# Patient Record
Sex: MALE | Race: WHITE | Employment: OTHER | URBAN - METROPOLITAN AREA
[De-identification: names, ages, dates, MRNs, and addresses within clinical notes are randomized per-mention and may not be internally consistent; named-entity substitution may affect disease eponyms.]

---

## 2019-03-06 ENCOUNTER — HOSPITAL ENCOUNTER (EMERGENCY)
Age: 49
Discharge: HOME | End: 2019-03-06
Attending: EMERGENCY MEDICINE
Payer: COMMERCIAL

## 2019-03-06 VITALS
SYSTOLIC BLOOD PRESSURE: 164 MMHG | RESPIRATION RATE: 18 BRPM | DIASTOLIC BLOOD PRESSURE: 96 MMHG | HEART RATE: 70 BPM | TEMPERATURE: 98 F | OXYGEN SATURATION: 100 % | WEIGHT: 213 LBS

## 2019-03-06 DIAGNOSIS — A09 TRAVELER'S DIARRHEA: Primary | ICD-10-CM

## 2019-03-06 PROCEDURE — 87329 GIARDIA AG IA: CPT | Performed by: EMERGENCY MEDICINE

## 2019-03-06 PROCEDURE — 87046 STOOL CULTR AEROBIC BACT EA: CPT | Performed by: EMERGENCY MEDICINE

## 2019-03-06 PROCEDURE — 82272 OCCULT BLD FECES 1-3 TESTS: CPT | Performed by: EMERGENCY MEDICINE

## 2019-03-06 PROCEDURE — 99283 EMERGENCY DEPT VISIT LOW MDM: CPT

## 2019-03-06 PROCEDURE — 87427 SHIGA-LIKE TOXIN AG IA: CPT | Performed by: EMERGENCY MEDICINE

## 2019-03-06 PROCEDURE — 87177 OVA AND PARASITES SMEARS: CPT | Performed by: EMERGENCY MEDICINE

## 2019-03-06 PROCEDURE — 87272 CRYPTOSPORIDIUM AG IF: CPT | Performed by: EMERGENCY MEDICINE

## 2019-03-06 PROCEDURE — 87045 FECES CULTURE AEROBIC BACT: CPT | Performed by: EMERGENCY MEDICINE

## 2019-03-06 PROCEDURE — 87493 C DIFF AMPLIFIED PROBE: CPT | Performed by: EMERGENCY MEDICINE

## 2019-03-06 PROCEDURE — 87209 SMEAR COMPLEX STAIN: CPT | Performed by: EMERGENCY MEDICINE

## 2019-03-06 RX ORDER — ONDANSETRON 4 MG/1
4 TABLET, ORALLY DISINTEGRATING ORAL ONCE
Status: COMPLETED | OUTPATIENT
Start: 2019-03-06 | End: 2019-03-06

## 2019-03-06 RX ORDER — ONDANSETRON 4 MG/1
4 TABLET, ORALLY DISINTEGRATING ORAL EVERY 4 HOURS PRN
Qty: 10 TABLET | Refills: 0 | Status: SHIPPED | OUTPATIENT
Start: 2019-03-06 | End: 2019-03-13

## 2019-03-06 RX ORDER — AZITHROMYCIN 500 MG/1
500 TABLET, FILM COATED ORAL DAILY
Qty: 3 TABLET | Refills: 0 | Status: SHIPPED | OUTPATIENT
Start: 2019-03-06 | End: 2019-03-09

## 2019-03-06 NOTE — ED PROVIDER NOTES
Patient Seen in: [facility] Emergency Department In [place]    History   Patient presents with:  Abdomen/Flank Pain (GI/)    Stated Complaint: abd pain    HPI    52-year-old male, recently returned from [facility] here with diarrhea.   He has had diarrhea pr mood and affect. Thought content normal.       ED Course     Labs Reviewed   C. DIFFICILE(TOXIGENIC)PCR   OCCULT BLOOD, STOOL   STOOL CULTURE W/SHIGATOXIN   OVA AND PARASITE W GIARDIA EIA          Patient was given oral Zofran and felt better.       LYNDSAY   W

## 2019-03-06 NOTE — ED INITIAL ASSESSMENT (HPI)
Pt was in [address] Monday when he started getting abdominal cramps and diarrhea.  Denies nausea or vomiting

## 2019-03-07 LAB
C DIFF TOX B STL QL: NEGATIVE
CRYPTOSP AG STL QL IA: NEGATIVE
G LAMBLIA AG STL QL IA: NEGATIVE

## 2019-03-10 LAB
OVA AND PARASITE, TRICHROME STAIN: NEGATIVE
OVA AND PARASITE, WET MOUNT: NEGATIVE

## 2021-08-14 ENCOUNTER — APPOINTMENT (OUTPATIENT)
Dept: GENERAL RADIOLOGY | Facility: HOSPITAL | Age: 51
DRG: 177 | End: 2021-08-14
Attending: EMERGENCY MEDICINE
Payer: COMMERCIAL

## 2021-08-14 ENCOUNTER — HOSPITAL ENCOUNTER (INPATIENT)
Facility: HOSPITAL | Age: 51
LOS: 13 days | Discharge: HOME | DRG: 177 | End: 2021-08-28
Attending: EMERGENCY MEDICINE | Admitting: INTERNAL MEDICINE
Payer: COMMERCIAL

## 2021-08-14 DIAGNOSIS — J12.82 PNEUMONIA DUE TO COVID-19 VIRUS: Primary | ICD-10-CM

## 2021-08-14 DIAGNOSIS — U07.1 PNEUMONIA DUE TO COVID-19 VIRUS: Primary | ICD-10-CM

## 2021-08-14 DIAGNOSIS — R09.02 HYPOXIA: ICD-10-CM

## 2021-08-14 PROBLEM — E87.1 HYPONATREMIA: Status: ACTIVE | Noted: 2021-08-14

## 2021-08-14 LAB
ALBUMIN SERPL-MCNC: 3.8 G/DL (ref 3.4–5)
ALBUMIN/GLOB SERPL: 1 {RATIO} (ref 1–2)
ALP LIVER SERPL-CCNC: 37 U/L
ALT SERPL-CCNC: 34 U/L
ANION GAP SERPL CALC-SCNC: 4 MMOL/L (ref 0–18)
AST SERPL-CCNC: 31 U/L (ref 15–37)
BASOPHILS # BLD AUTO: 0.01 X10(3) UL (ref 0–0.2)
BASOPHILS NFR BLD AUTO: 0.3 %
BILIRUB SERPL-MCNC: 0.6 MG/DL (ref 0.1–2)
BUN BLD-MCNC: 13 MG/DL (ref 7–18)
CALCIUM BLD-MCNC: 9.1 MG/DL (ref 8.5–10.1)
CHLORIDE SERPL-SCNC: 102 MMOL/L (ref 98–112)
CO2 SERPL-SCNC: 29 MMOL/L (ref 21–32)
CREAT BLD-MCNC: 0.93 MG/DL
D-DIMER: 0.42 UG/ML FEU (ref ?–0.5)
EOSINOPHIL # BLD AUTO: 0 X10(3) UL (ref 0–0.7)
EOSINOPHIL NFR BLD AUTO: 0 %
ERYTHROCYTE [DISTWIDTH] IN BLOOD BY AUTOMATED COUNT: 12.5 %
GLOBULIN PLAS-MCNC: 3.9 G/DL (ref 2.8–4.4)
GLUCOSE BLD-MCNC: 90 MG/DL (ref 70–99)
HCT VFR BLD AUTO: 44.5 %
HGB BLD-MCNC: 14.2 G/DL
IMM GRANULOCYTES # BLD AUTO: 0.01 X10(3) UL (ref 0–1)
IMM GRANULOCYTES NFR BLD: 0.3 %
LYMPHOCYTES # BLD AUTO: 0.47 X10(3) UL (ref 1–4)
LYMPHOCYTES NFR BLD AUTO: 15.8 %
M PROTEIN MFR SERPL ELPH: 7.7 G/DL (ref 6.4–8.2)
MCH RBC QN AUTO: 29.6 PG (ref 26–34)
MCHC RBC AUTO-ENTMCNC: 31.9 G/DL (ref 31–37)
MCV RBC AUTO: 92.7 FL
MONOCYTES # BLD AUTO: 0.18 X10(3) UL (ref 0.1–1)
MONOCYTES NFR BLD AUTO: 6.1 %
NEUTROPHILS # BLD AUTO: 2.3 X10 (3) UL (ref 1.5–7.7)
NEUTROPHILS # BLD AUTO: 2.3 X10(3) UL (ref 1.5–7.7)
NEUTROPHILS NFR BLD AUTO: 77.5 %
OSMOLALITY SERPL CALC.SUM OF ELEC: 280 MOSM/KG (ref 275–295)
PLATELET # BLD AUTO: 180 10(3)UL (ref 150–450)
POTASSIUM SERPL-SCNC: 3.6 MMOL/L (ref 3.5–5.1)
RBC # BLD AUTO: 4.8 X10(6)UL
SARS-COV-2 RNA RESP QL NAA+PROBE: DETECTED
SODIUM SERPL-SCNC: 135 MMOL/L (ref 136–145)
TROPONIN I SERPL-MCNC: <0.045 NG/ML (ref ?–0.04)
WBC # BLD AUTO: 3 X10(3) UL (ref 4–11)

## 2021-08-14 PROCEDURE — 71045 X-RAY EXAM CHEST 1 VIEW: CPT | Performed by: EMERGENCY MEDICINE

## 2021-08-14 PROCEDURE — 3E0333Z INTRODUCTION OF ANTI-INFLAMMATORY INTO PERIPHERAL VEIN, PERCUTANEOUS APPROACH: ICD-10-PCS | Performed by: INTERNAL MEDICINE

## 2021-08-14 RX ORDER — LOSARTAN POTASSIUM AND HYDROCHLOROTHIAZIDE 12.5; 5 MG/1; MG/1
1 TABLET ORAL DAILY
COMMUNITY
End: 2021-09-03

## 2021-08-14 RX ORDER — DEXAMETHASONE SODIUM PHOSPHATE 4 MG/ML
6 VIAL (ML) INJECTION ONCE
Status: COMPLETED | OUTPATIENT
Start: 2021-08-14 | End: 2021-08-14

## 2021-08-15 PROBLEM — R09.02 HYPOXIA: Status: ACTIVE | Noted: 2021-08-15

## 2021-08-15 LAB
ALBUMIN SERPL-MCNC: 3.2 G/DL (ref 3.4–5)
ALBUMIN/GLOB SERPL: 0.8 {RATIO} (ref 1–2)
ALP LIVER SERPL-CCNC: 32 U/L
ALT SERPL-CCNC: 31 U/L
ANION GAP SERPL CALC-SCNC: 2 MMOL/L (ref 0–18)
AST SERPL-CCNC: 30 U/L (ref 15–37)
BASOPHILS # BLD AUTO: 0 X10(3) UL (ref 0–0.2)
BASOPHILS NFR BLD AUTO: 0 %
BILIRUB SERPL-MCNC: 0.4 MG/DL (ref 0.1–2)
BUN BLD-MCNC: 16 MG/DL (ref 7–18)
CALCIUM BLD-MCNC: 9 MG/DL (ref 8.5–10.1)
CHLORIDE SERPL-SCNC: 104 MMOL/L (ref 98–112)
CO2 SERPL-SCNC: 29 MMOL/L (ref 21–32)
CREAT BLD-MCNC: 0.83 MG/DL
CRP SERPL-MCNC: 9.09 MG/DL (ref ?–0.3)
D-DIMER: 0.28 UG/ML FEU (ref ?–0.5)
DEPRECATED HBV CORE AB SER IA-ACNC: 2527.2 NG/ML
EOSINOPHIL # BLD AUTO: 0 X10(3) UL (ref 0–0.7)
EOSINOPHIL NFR BLD AUTO: 0 %
ERYTHROCYTE [DISTWIDTH] IN BLOOD BY AUTOMATED COUNT: 12.5 %
GLOBULIN PLAS-MCNC: 3.9 G/DL (ref 2.8–4.4)
GLUCOSE BLD-MCNC: 107 MG/DL (ref 70–99)
HCT VFR BLD AUTO: 42 %
HGB BLD-MCNC: 13.6 G/DL
IL6 SERPL-MCNC: 38.8 PG/ML (ref ?–4.4)
IMM GRANULOCYTES # BLD AUTO: 0.01 X10(3) UL (ref 0–1)
IMM GRANULOCYTES NFR BLD: 0.3 %
LDH SERPL L TO P-CCNC: 260 U/L
LYMPHOCYTES # BLD AUTO: 0.4 X10(3) UL (ref 1–4)
LYMPHOCYTES NFR BLD AUTO: 12.7 %
M PROTEIN MFR SERPL ELPH: 7.1 G/DL (ref 6.4–8.2)
MCH RBC QN AUTO: 30.2 PG (ref 26–34)
MCHC RBC AUTO-ENTMCNC: 32.4 G/DL (ref 31–37)
MCV RBC AUTO: 93.1 FL
MONOCYTES # BLD AUTO: 0.17 X10(3) UL (ref 0.1–1)
MONOCYTES NFR BLD AUTO: 5.4 %
NEUTROPHILS # BLD AUTO: 2.56 X10 (3) UL (ref 1.5–7.7)
NEUTROPHILS # BLD AUTO: 2.56 X10(3) UL (ref 1.5–7.7)
NEUTROPHILS NFR BLD AUTO: 81.6 %
OSMOLALITY SERPL CALC.SUM OF ELEC: 282 MOSM/KG (ref 275–295)
PLATELET # BLD AUTO: 200 10(3)UL (ref 150–450)
POTASSIUM SERPL-SCNC: 4.3 MMOL/L (ref 3.5–5.1)
RBC # BLD AUTO: 4.51 X10(6)UL
SODIUM SERPL-SCNC: 135 MMOL/L (ref 136–145)
WBC # BLD AUTO: 3.1 X10(3) UL (ref 4–11)

## 2021-08-15 PROCEDURE — XW033E5 INTRODUCTION OF REMDESIVIR ANTI-INFECTIVE INTO PERIPHERAL VEIN, PERCUTANEOUS APPROACH, NEW TECHNOLOGY GROUP 5: ICD-10-PCS | Performed by: INTERNAL MEDICINE

## 2021-08-15 PROCEDURE — 99223 1ST HOSP IP/OBS HIGH 75: CPT | Performed by: INTERNAL MEDICINE

## 2021-08-15 RX ORDER — ONDANSETRON 2 MG/ML
4 INJECTION INTRAMUSCULAR; INTRAVENOUS EVERY 6 HOURS PRN
Status: DISCONTINUED | OUTPATIENT
Start: 2021-08-15 | End: 2021-08-28

## 2021-08-15 RX ORDER — ENOXAPARIN SODIUM 100 MG/ML
40 INJECTION SUBCUTANEOUS DAILY
Status: DISCONTINUED | OUTPATIENT
Start: 2021-08-15 | End: 2021-08-20

## 2021-08-15 RX ORDER — GUAIFENESIN 600 MG
600 TABLET, EXTENDED RELEASE 12 HR ORAL 2 TIMES DAILY
Status: DISCONTINUED | OUTPATIENT
Start: 2021-08-15 | End: 2021-08-28

## 2021-08-15 RX ORDER — ZINC SULFATE 50(220)MG
220 CAPSULE ORAL DAILY
Status: ON HOLD | COMMUNITY
End: 2021-08-28

## 2021-08-15 RX ORDER — MELATONIN
3 NIGHTLY PRN
Status: DISCONTINUED | OUTPATIENT
Start: 2021-08-15 | End: 2021-08-28

## 2021-08-15 RX ORDER — BENZONATATE 200 MG/1
200 CAPSULE ORAL 3 TIMES DAILY PRN
Status: DISCONTINUED | OUTPATIENT
Start: 2021-08-15 | End: 2021-08-28

## 2021-08-15 RX ORDER — ALBUTEROL SULFATE 90 UG/1
4 AEROSOL, METERED RESPIRATORY (INHALATION) EVERY 4 HOURS PRN
Status: DISCONTINUED | OUTPATIENT
Start: 2021-08-15 | End: 2021-08-28

## 2021-08-15 RX ORDER — DEXAMETHASONE SODIUM PHOSPHATE 4 MG/ML
6 VIAL (ML) INJECTION DAILY
Status: DISCONTINUED | OUTPATIENT
Start: 2021-08-15 | End: 2021-08-21

## 2021-08-15 RX ORDER — ASCORBIC ACID 500 MG
1000 TABLET ORAL DAILY
Status: DISCONTINUED | OUTPATIENT
Start: 2021-08-15 | End: 2021-08-28

## 2021-08-15 RX ORDER — ZINC SULFATE 50(220)MG
220 CAPSULE ORAL 2 TIMES DAILY
Status: DISCONTINUED | OUTPATIENT
Start: 2021-08-15 | End: 2021-08-28

## 2021-08-15 RX ORDER — MULTIVIT WITH MINERALS/LUTEIN
1000 TABLET ORAL DAILY
Status: ON HOLD | COMMUNITY
End: 2021-08-28

## 2021-08-15 RX ORDER — CHOLECALCIFEROL (VITAMIN D3) 125 MCG
2000 CAPSULE ORAL DAILY
Status: DISCONTINUED | OUTPATIENT
Start: 2021-08-15 | End: 2021-08-28

## 2021-08-15 RX ORDER — ACETAMINOPHEN 325 MG/1
650 TABLET ORAL EVERY 6 HOURS PRN
Status: DISCONTINUED | OUTPATIENT
Start: 2021-08-15 | End: 2021-08-28

## 2021-08-15 RX ORDER — LOSARTAN POTASSIUM 50 MG/1
50 TABLET ORAL DAILY
Status: DISCONTINUED | OUTPATIENT
Start: 2021-08-15 | End: 2021-08-22

## 2021-08-15 RX ORDER — ONDANSETRON 2 MG/ML
4 INJECTION INTRAMUSCULAR; INTRAVENOUS EVERY 4 HOURS PRN
Status: ACTIVE | OUTPATIENT
Start: 2021-08-15 | End: 2021-08-15

## 2021-08-15 RX ORDER — PROCHLORPERAZINE EDISYLATE 5 MG/ML
5 INJECTION INTRAMUSCULAR; INTRAVENOUS EVERY 8 HOURS PRN
Status: DISCONTINUED | OUTPATIENT
Start: 2021-08-15 | End: 2021-08-28

## 2021-08-15 NOTE — PLAN OF CARE
NURSING ADMISSION NOTE    Pt admitted from ED to room 516. Patient admitted via Cart. Oriented to room. Safety precautions initiated. Bed in low position. Call light in reach. Admission navigator completed. Pt a/ox4. VSS. NSR per tele.   Angel Contreras

## 2021-08-15 NOTE — CONSULTS
INFECTIOUS DISEASE CONSULT NOTE    [address] [facility] Patient Status:  Inpatient    1970 MRN [id number]   [facility] 5NW-A Attending Artis Rosa,    Hosp Day # 0 PCP None Pcp       Reason for Consultation:  Covid 19 infection    Hist Oral, Daily **OR** dexamethasone Sodium Phosphate (DECADRON) 4 MG/ML injection 6 mg, 6 mg, Intravenous, Daily  •  benzonatate (TESSALON) cap 200 mg, 200 mg, Oral, TID PRN  •  zinc sulfate (ZINCATE) cap 220 mg, 220 mg, Oral, BID  •  Vitamin D3 (Cholecalcife RDW 12.5   NEPRELIM 2.30   WBC 3.0*   .0     Recent Labs   Lab 08/14/21  2226   GLU 90   BUN 13   CREATSERUM 0.93   GFRAA 110   GFRNAA 95   CA 9.1   ALB 3.8   *   K 3.6      CO2 29.0   ALKPHO 37*   AST 31   ALT 34   BILT 0.6   TP 7.7 dexa  - start RDV  - follow O2 sats, would consider actemra if develops hypoxia with O2 requirements above 6L  - prone as able, otherwise encourage ambulation and IS  - trend inflammatory markers    2. acute hypoxic resp failure due to above  - wean O2 as

## 2021-08-15 NOTE — PLAN OF CARE
COVID-19 Daily Discharge Readiness-Nursing    O2 Sat at Rest:     %   O2 Sat with Exertion:    % on    liters   Temperature max from last 24 hrs: Temp (24hrs), Av.7 °F (37.1 °C), Min:97.8 °F (36.6 °C), Max:100 °F (37.8 °C)    Inflammatory Markers:   Re

## 2021-08-15 NOTE — ED PROVIDER NOTES
Patient Seen in: [facility] 5nw-a      History   Patient presents with:  Covid    Stated Complaint: LIGHTHEADED, COVID, LOW PULSE OX    HPI/Subjective:   HPI    Patient 60-year-old male who is unvaccinated.   Patient states last Saturday he developed sounds, soft, nontender, nondistended. No rebound, no guarding, no hepatosplenomegaly. EXTREMITIES: Full range of motion, no tenderness, good capillary refill. No calf tenderness or edema  SKIN: No rash, good turgor.   NEURO: Patient answers questions ap will be admitted for further evaluation. Patient did receive Decadron.   I did speak with the [facility] hospitalist.  Admission disposition: 8/15/2021 12:18 AM                                  Disposition and Plan     Clinical Impression:  Pneumonia due to CO

## 2021-08-15 NOTE — ED INITIAL ASSESSMENT (HPI)
Pt hung out with a group of people that all got covid. Pt dx Tuesday with covid. Pt reports short of breath, cough, dizziness. Pt took tylenol.

## 2021-08-15 NOTE — H&P
MARIE HOSPITALIST  History and Physical     Lemuel Burns Patient Status:  Emergency    1970 MRN [id number]   Location [address] [facility] Attending Juany Ricks MD   Hosp Day # 0 PCP None Pcp     Chief Complaint: SOB    Histor No lymphadenopathy. No JVD. No carotid bruits. Respiratory: Clear to auscultation bilaterally. No wheezes. No rhonchi. Cardiovascular: S1, S2. Regular rate and rhythm. No murmurs, rubs or gallops. Equal pulses.    Chest and Back: No tenderness or deformit

## 2021-08-16 LAB
ALBUMIN SERPL-MCNC: 3.3 G/DL (ref 3.4–5)
ALBUMIN/GLOB SERPL: 0.8 {RATIO} (ref 1–2)
ALP LIVER SERPL-CCNC: 34 U/L
ALT SERPL-CCNC: 36 U/L
ANION GAP SERPL CALC-SCNC: 4 MMOL/L (ref 0–18)
AST SERPL-CCNC: 31 U/L (ref 15–37)
ATRIAL RATE: 84 BPM
BASOPHILS # BLD AUTO: 0.01 X10(3) UL (ref 0–0.2)
BASOPHILS NFR BLD AUTO: 0.1 %
BILIRUB SERPL-MCNC: 0.5 MG/DL (ref 0.1–2)
BUN BLD-MCNC: 18 MG/DL (ref 7–18)
CALCIUM BLD-MCNC: 9.2 MG/DL (ref 8.5–10.1)
CHLORIDE SERPL-SCNC: 107 MMOL/L (ref 98–112)
CO2 SERPL-SCNC: 27 MMOL/L (ref 21–32)
CREAT BLD-MCNC: 0.91 MG/DL
CRP SERPL-MCNC: 6.02 MG/DL (ref ?–0.3)
D-DIMER: 0.3 UG/ML FEU (ref ?–0.5)
DEPRECATED HBV CORE AB SER IA-ACNC: 2083.3 NG/ML
EOSINOPHIL # BLD AUTO: 0 X10(3) UL (ref 0–0.7)
EOSINOPHIL NFR BLD AUTO: 0 %
ERYTHROCYTE [DISTWIDTH] IN BLOOD BY AUTOMATED COUNT: 12.7 %
GLOBULIN PLAS-MCNC: 4 G/DL (ref 2.8–4.4)
GLUCOSE BLD-MCNC: 109 MG/DL (ref 70–99)
HCT VFR BLD AUTO: 42 %
HGB BLD-MCNC: 14.3 G/DL
IMM GRANULOCYTES # BLD AUTO: 0.02 X10(3) UL (ref 0–1)
IMM GRANULOCYTES NFR BLD: 0.2 %
LDH SERPL L TO P-CCNC: 303 U/L
LYMPHOCYTES # BLD AUTO: 0.52 X10(3) UL (ref 1–4)
LYMPHOCYTES NFR BLD AUTO: 5.4 %
M PROTEIN MFR SERPL ELPH: 7.3 G/DL (ref 6.4–8.2)
MCH RBC QN AUTO: 31 PG (ref 26–34)
MCHC RBC AUTO-ENTMCNC: 34 G/DL (ref 31–37)
MCV RBC AUTO: 90.9 FL
MONOCYTES # BLD AUTO: 0.59 X10(3) UL (ref 0.1–1)
MONOCYTES NFR BLD AUTO: 6.1 %
NEUTROPHILS # BLD AUTO: 8.55 X10 (3) UL (ref 1.5–7.7)
NEUTROPHILS # BLD AUTO: 8.55 X10(3) UL (ref 1.5–7.7)
NEUTROPHILS NFR BLD AUTO: 88.2 %
OSMOLALITY SERPL CALC.SUM OF ELEC: 288 MOSM/KG (ref 275–295)
P AXIS: -6 DEGREES
P-R INTERVAL: 152 MS
PLATELET # BLD AUTO: 243 10(3)UL (ref 150–450)
POTASSIUM SERPL-SCNC: 4.3 MMOL/L (ref 3.5–5.1)
Q-T INTERVAL: 352 MS
QRS DURATION: 96 MS
QTC CALCULATION (BEZET): 415 MS
R AXIS: 71 DEGREES
RBC # BLD AUTO: 4.62 X10(6)UL
SODIUM SERPL-SCNC: 138 MMOL/L (ref 136–145)
T AXIS: 21 DEGREES
VENTRICULAR RATE: 84 BPM
WBC # BLD AUTO: 9.7 X10(3) UL (ref 4–11)

## 2021-08-16 PROCEDURE — 99232 SBSQ HOSP IP/OBS MODERATE 35: CPT | Performed by: HOSPITALIST

## 2021-08-16 NOTE — PAYOR COMM NOTE
--------------  ADMISSION REVIEW     Payor: JOSE ZUNIGA  Subscriber #:  [id number]  Authorization Number: [id number]    Admit date: 8/15/21  Admit time:  2:23 AM      Patient Seen in: [facility] ED    History   Stated Complaint: LIGHTHEADED, COVID, LOW x-rayShallow inspiration.  Possible ground-glass opacity in left mid lower lung may represent mild COVID-19 pneumonia.         MDM      Patient pulse ox did drop to 89% on room air at rest.  Patient will be admitted for further evaluation.   Patient did rec Quyen Allen is a a(n) 49year old male admitted today with covid 19 pna. His symptoms started 8 days ago with fevers, malaise and cough tested + on 8/10. Also with n/v/d initially which resolved but fevers, malaise and cough persisted.  Yesterday he noted more 9.1 9.0 9.2   ALB 3.8 3.2* 3.3*   * 135* 138   K 3.6 4.3 4.3    104 107   CO2 29.0 29.0 27.0   ALKPHO 37* 32* 34*   AST 31 30 31   ALT 34 31 36   BILT 0.6 0.4 0.5   TP 7.7 7.1 7.3      No results found for: ESRML       C-Reactive Protein (mg/dL mg in sodium chloride 0.9% 250 mL IVPB     Date Action Dose Route User    8/15/2021 1657 New Bag 200 mg Intravenous Radha Ureña, RN      Vitamin D3 (Cholecalciferol) tab 2,000 Units     Date Action Dose Route User    8/16/2021 0840 Given 2,000 Unit

## 2021-08-16 NOTE — PROGRESS NOTES
COVID-19 Daily Discharge Readiness-Nursing    O2 Sat at Rest:    92 % RA  O2 Sat with Exertion:     Temperature max from last 24 hrs: Temp (24hrs), Av.8 °F (37.1 °C), Min:98.4 °F (36.9 °C), Max:99.5 °F (37.5 °C)    Inflammatory Markers:   Recent Labs

## 2021-08-16 NOTE — PROGRESS NOTES
COVID-19 Daily Discharge Readiness-Nursing    O2 Sat at Rest:   91% on 2L O2  Temperature max from last 24 hrs: Temp (24hrs), Av.9 °F (37.2 °C), Min:98.4 °F (36.9 °C), Max:100 °F (37.8 °C)    Inflammatory Markers:   Recent Labs   Lab 21  [id number]

## 2021-08-16 NOTE — PROGRESS NOTES
INFECTIOUS DISEASE PROGRESS NOTE    [facility], [facility]. Patient Status:  Inpatient    1970 MRN [id number]   [facility] 5NW-A Attending Edd Gutierres DO   Hosp Day # 1 PCP None Pcp     Remdesivir d#2  Dexa      Subjective: Pt seen and exa Supple. Respiratory: Clear to auscultation bilaterally. No wheezes. No rhonchi. Cardiovascular: S1, S2.  Regular rate and rhythm. No murmurs. Abdomen: Soft, nontender, nondistended. Positive bowel sounds.   Musculoskeletal: Full range of motion of all (CPT=71045)    Result Date: 8/14/2021  PROCEDURE:  XR CHEST AP PORTABLE  (CPT=71045)  TECHNIQUE:  AP chest radiograph was obtained. COMPARISON:  None.   INDICATIONS:  LIGHTHEADED, COVID, LOW PULSE OX  PATIENT STATED HISTORY: (As transcribed by Technologist

## 2021-08-16 NOTE — PROGRESS NOTES
MARIE HOSPITALIST  Progress Note     Levie Dancer Patient Status:  Inpatient    1970 MRN [id number]   [facility] 5NW-A Attending Lilly Sylvester, [address] [facility] Day # 1 PCP None Pcp     Chief Complaint: SOB    S: Patient seen and examined hours. Cardiac  Recent Labs   Lab 08/14/21 2226   TROP <0.045       Creatinine Kinase  No results for input(s): CK in the last 168 hours.     Inflammatory Markers  Recent Labs   Lab 08/14/21  2226 08/15/21  1650 08/16/21  0844   CRP  --  9.09* 6.02*   F

## 2021-08-17 LAB
ALBUMIN SERPL-MCNC: 3.1 G/DL (ref 3.4–5)
ALBUMIN/GLOB SERPL: 0.9 {RATIO} (ref 1–2)
ALP LIVER SERPL-CCNC: 31 U/L
ALT SERPL-CCNC: 34 U/L
ANION GAP SERPL CALC-SCNC: 4 MMOL/L (ref 0–18)
AST SERPL-CCNC: 27 U/L (ref 15–37)
BASOPHILS # BLD AUTO: 0.01 X10(3) UL (ref 0–0.2)
BASOPHILS NFR BLD AUTO: 0.1 %
BILIRUB SERPL-MCNC: 0.3 MG/DL (ref 0.1–2)
BUN BLD-MCNC: 20 MG/DL (ref 7–18)
CALCIUM BLD-MCNC: 8.7 MG/DL (ref 8.5–10.1)
CHLORIDE SERPL-SCNC: 108 MMOL/L (ref 98–112)
CO2 SERPL-SCNC: 28 MMOL/L (ref 21–32)
CREAT BLD-MCNC: 0.84 MG/DL
CRP SERPL-MCNC: 4.46 MG/DL (ref ?–0.3)
D-DIMER: 0.32 UG/ML FEU (ref ?–0.5)
DEPRECATED HBV CORE AB SER IA-ACNC: 1460.7 NG/ML
EOSINOPHIL # BLD AUTO: 0 X10(3) UL (ref 0–0.7)
EOSINOPHIL NFR BLD AUTO: 0 %
ERYTHROCYTE [DISTWIDTH] IN BLOOD BY AUTOMATED COUNT: 12.5 %
GLOBULIN PLAS-MCNC: 3.3 G/DL (ref 2.8–4.4)
GLUCOSE BLD-MCNC: 125 MG/DL (ref 70–99)
HCT VFR BLD AUTO: 38.3 %
HGB BLD-MCNC: 12.8 G/DL
IMM GRANULOCYTES # BLD AUTO: 0.04 X10(3) UL (ref 0–1)
IMM GRANULOCYTES NFR BLD: 0.5 %
LDH SERPL L TO P-CCNC: 261 U/L
LYMPHOCYTES # BLD AUTO: 0.46 X10(3) UL (ref 1–4)
LYMPHOCYTES NFR BLD AUTO: 5.4 %
M PROTEIN MFR SERPL ELPH: 6.4 G/DL (ref 6.4–8.2)
MCH RBC QN AUTO: 30.5 PG (ref 26–34)
MCHC RBC AUTO-ENTMCNC: 33.4 G/DL (ref 31–37)
MCV RBC AUTO: 91.2 FL
MONOCYTES # BLD AUTO: 0.78 X10(3) UL (ref 0.1–1)
MONOCYTES NFR BLD AUTO: 9.1 %
NEUTROPHILS # BLD AUTO: 7.24 X10 (3) UL (ref 1.5–7.7)
NEUTROPHILS # BLD AUTO: 7.24 X10(3) UL (ref 1.5–7.7)
NEUTROPHILS NFR BLD AUTO: 84.9 %
OSMOLALITY SERPL CALC.SUM OF ELEC: 294 MOSM/KG (ref 275–295)
PLATELET # BLD AUTO: 249 10(3)UL (ref 150–450)
POTASSIUM SERPL-SCNC: 4.2 MMOL/L (ref 3.5–5.1)
RBC # BLD AUTO: 4.2 X10(6)UL
SODIUM SERPL-SCNC: 140 MMOL/L (ref 136–145)
WBC # BLD AUTO: 8.5 X10(3) UL (ref 4–11)

## 2021-08-17 PROCEDURE — 99232 SBSQ HOSP IP/OBS MODERATE 35: CPT | Performed by: HOSPITALIST

## 2021-08-17 RX ORDER — ECHINACEA PURPUREA EXTRACT 125 MG
1 TABLET ORAL
Status: DISCONTINUED | OUTPATIENT
Start: 2021-08-17 | End: 2021-08-28

## 2021-08-17 NOTE — PROGRESS NOTES
MARIE HOSPITALIST  Progress Note     Linda Crouch Patient Status:  Inpatient    1970 MRN [id number]   [facility] 5NW-A Attending Glen Valera, [address] [facility] Day # 2 PCP None Pcp     Chief Complaint: SOB    S: Patient seen and examined Pro-Calcitonin  No results for input(s): PCT in the last 168 hours. Cardiac  Recent Labs   Lab 08/14/21  2226   TROP <0.045       Creatinine Kinase  No results for input(s): CK in the last 168 hours.     Inflammatory Markers  Recent Labs   Lab 08/1

## 2021-08-17 NOTE — PROGRESS NOTES
INFECTIOUS DISEASE PROGRESS NOTE    [facility]. Patient Status:  Inpatient    1970 MRN [id number]   [facility] 5NW-A Attending Julieta Rose DO   Hosp Day # 2 PCP None Pcp     Remdesivir d#3  Dexa      Subjective: Pt seen and exa SpO2 94 %. Alert and oriented. No distress  HEENT: no scleral icterus or conjunctival injection. Moist mucous membranes. No thrush  Neck: No lymphadenopathy. Supple. Respiratory: Clear to auscultation bilaterally. No wheezes. No rhonchi.   Casey Ordoñez * 303* 261*   DDIMER 0.28 0.30 0.32       Microbiology    Reviewed in EMR,   No results found for this visit on 08/14/21.       Radiology: XR CHEST AP PORTABLE  (CPT=71045)    Result Date: 8/14/2021  PROCEDURE:  XR CHEST AP PORTABLE  (CPT=71045)  T

## 2021-08-17 NOTE — PROGRESS NOTES
COVID-19 Daily Discharge Readiness-Nursing    O2 Sat at Rest:    93 % 2L   O2 Sat with Exertion:    91 % on    3 liters  Temperature max from last 24 hrs: Temp (24hrs), Av.5 °F (36.9 °C), Min:97.7 °F (36.5 °C), Max:99.6 °F (37.6 °C)    Inflammatory Mar

## 2021-08-17 NOTE — COVID NURSING ASSESSMENT
COVID-19 Daily Discharge Readiness-Nursing    O2 Sat at Rest:  93%  3L  Temperature max from last 24 hrs: Temp (24hrs), Av.7 °F (37.1 °C), Min:97.7 °F (36.5 °C), Max:99.6 °F (37.6 °C)    Inflammatory Markers:   Recent Labs   Lab 08/15/21  1650 21 needed  - Assess and instruct to report SOB or any respiratory difficulty  - Respiratory Therapy support as indicated  - Manage/alleviate anxiety  - Monitor for signs/symptoms of CO2 retention  Outcome: Progressing

## 2021-08-18 LAB
ALBUMIN SERPL-MCNC: 3 G/DL (ref 3.4–5)
ALBUMIN/GLOB SERPL: 0.9 {RATIO} (ref 1–2)
ALP LIVER SERPL-CCNC: 34 U/L
ALT SERPL-CCNC: 31 U/L
ANION GAP SERPL CALC-SCNC: 5 MMOL/L (ref 0–18)
AST SERPL-CCNC: 21 U/L (ref 15–37)
BILIRUB SERPL-MCNC: 0.6 MG/DL (ref 0.1–2)
BUN BLD-MCNC: 16 MG/DL (ref 7–18)
CALCIUM BLD-MCNC: 8.5 MG/DL (ref 8.5–10.1)
CHLORIDE SERPL-SCNC: 108 MMOL/L (ref 98–112)
CO2 SERPL-SCNC: 27 MMOL/L (ref 21–32)
CREAT BLD-MCNC: 0.76 MG/DL
CRP SERPL-MCNC: 5.06 MG/DL (ref ?–0.3)
DEPRECATED HBV CORE AB SER IA-ACNC: 1313.7 NG/ML
GLOBULIN PLAS-MCNC: 3.3 G/DL (ref 2.8–4.4)
GLUCOSE BLD-MCNC: 116 MG/DL (ref 70–99)
IL6 SERPL-MCNC: 10.5 PG/ML (ref ?–4.4)
LDH SERPL L TO P-CCNC: 294 U/L
M PROTEIN MFR SERPL ELPH: 6.3 G/DL (ref 6.4–8.2)
OSMOLALITY SERPL CALC.SUM OF ELEC: 292 MOSM/KG (ref 275–295)
POTASSIUM SERPL-SCNC: 3.9 MMOL/L (ref 3.5–5.1)
SODIUM SERPL-SCNC: 140 MMOL/L (ref 136–145)

## 2021-08-18 PROCEDURE — 99232 SBSQ HOSP IP/OBS MODERATE 35: CPT | Performed by: HOSPITALIST

## 2021-08-18 NOTE — PROGRESS NOTES
INFECTIOUS DISEASE PROGRESS NOTE    [facility], [facility]. Patient Status:  Inpatient    1970 MRN [id number]   [facility] 5NW-A Attending Ashley Butler DO   Hosp Day # 3 PCP None Pcp     Remdesivir d#4  Dexa      Subjective: Pt not examined oriented      Laboratory Data:    Recent Labs   Lab 08/15/21  1650 08/16/21  0844 08/17/21  0538   RBC 4.51 4.62 4.20*   HGB 13.6 14.3 12.8*   HCT 42.0 42.0 38.3*   MCV 93.1 90.9 91.2   MCH 30.2 31.0 30.5   MCHC 32.4 34.0 33.4   RDW 12.5 12.7 12.5   NEPREL is having some difficulty breathing. FINDINGS:  Low lung volumes. Magnified heart and normal pulmonary vascularity. No focal consolidation. Possible mild ground-glass opacity in left mid and lower lung. CONCLUSION:  Shallow inspiration.   P

## 2021-08-18 NOTE — PLAN OF CARE
Problem: Patient/Family Goals  Goal: Patient/Family Long Term Goal  Description: Patient's Long Term Goal: go home with no oxygen    Interventions:  - Continue to wean O2  -O2 walk  -Rem/dec  - See additional Care Plan goals for specific interventions  8/1

## 2021-08-18 NOTE — PAYOR COMM NOTE
--------------  CONTINUED STAY REVIEW    Payor: JOSE ZUNIGA  Subscriber #:  [id number]  Authorization Number: [id number]    Admit date: 8/15/21  Admit time:  2:23 AM    FAXING CLINICAL UPDATE FOR 8/18/21 8/18/21   Chief Complaint: SOB     S: Increased ox Date Action Dose Route     8/18/2021 0904 Given 40 mg Subcutaneous (Left Lower Abdomen)       guaiFENesin ER (MUCINEX) 12 hr tab 600 mg     Date Action Dose Route     8/18/2021 0905 Given 600 mg Oral     8/17/2021 2039 Given 600 mg Oral       losartan Pota

## 2021-08-18 NOTE — PLAN OF CARE
COVID-19 Daily Discharge Readiness-Nursing    O2 Sat at Rest: 91% on 3L  O2 Sat with Exertion: SPO2% Ambulation on Oxygen: 88  % on Ambulation oxygen flow (liters per minute): 15  liters   Temperature max from last 24 hrs: Temp (24hrs), Av.2 °F (36.8 ° scheduled within 7 days. .. [] Transitional Care Clinic (TCC)     [] Pulmonologist     [x] Primary Care Physician     [] Other Specialty    Watched the home discharge recovery videos related to diagnosis. ..      [x] Pneumonia     [] COPD    [] Home Healt

## 2021-08-18 NOTE — COVID NURSING ASSESSMENT
COVID-19 Daily Discharge Readiness-Nursing    O2 Sat at Rest:     %   O2 Sat with Exertion: SPO2% Ambulation on Oxygen: 88  % on Ambulation oxygen flow (liters per minute): 15  liters   Temperature max from last 24 hrs: Temp (24hrs), Av.3 °F (36.8 °C),

## 2021-08-19 LAB
ALBUMIN SERPL-MCNC: 2.9 G/DL (ref 3.4–5)
ALBUMIN/GLOB SERPL: 0.7 {RATIO} (ref 1–2)
ALP LIVER SERPL-CCNC: 34 U/L
ALT SERPL-CCNC: 32 U/L
ANION GAP SERPL CALC-SCNC: 1 MMOL/L (ref 0–18)
AST SERPL-CCNC: 18 U/L (ref 15–37)
BILIRUB SERPL-MCNC: 0.7 MG/DL (ref 0.1–2)
BUN BLD-MCNC: 19 MG/DL (ref 7–18)
CALCIUM BLD-MCNC: 9 MG/DL (ref 8.5–10.1)
CHLORIDE SERPL-SCNC: 111 MMOL/L (ref 98–112)
CO2 SERPL-SCNC: 27 MMOL/L (ref 21–32)
CREAT BLD-MCNC: 0.73 MG/DL
CRP SERPL-MCNC: 5.32 MG/DL (ref ?–0.3)
D-DIMER: 1.41 UG/ML FEU (ref ?–0.5)
DEPRECATED HBV CORE AB SER IA-ACNC: 1244.6 NG/ML
GLOBULIN PLAS-MCNC: 3.9 G/DL (ref 2.8–4.4)
GLUCOSE BLD-MCNC: 108 MG/DL (ref 70–99)
LDH SERPL L TO P-CCNC: 294 U/L
M PROTEIN MFR SERPL ELPH: 6.8 G/DL (ref 6.4–8.2)
OSMOLALITY SERPL CALC.SUM OF ELEC: 291 MOSM/KG (ref 275–295)
POTASSIUM SERPL-SCNC: 4.6 MMOL/L (ref 3.5–5.1)
SODIUM SERPL-SCNC: 139 MMOL/L (ref 136–145)

## 2021-08-19 PROCEDURE — 99232 SBSQ HOSP IP/OBS MODERATE 35: CPT | Performed by: HOSPITALIST

## 2021-08-19 PROCEDURE — XW0DXM6 INTRODUCTION OF BARICITINIB INTO MOUTH AND PHARYNX, EXTERNAL APPROACH, NEW TECHNOLOGY GROUP 6: ICD-10-PCS | Performed by: INTERNAL MEDICINE

## 2021-08-19 RX ORDER — ALPRAZOLAM 0.25 MG/1
0.25 TABLET ORAL EVERY 4 HOURS PRN
Status: DISCONTINUED | OUTPATIENT
Start: 2021-08-19 | End: 2021-08-21

## 2021-08-19 NOTE — COVID NURSING ASSESSMENT
COVID-19 Daily Discharge Readiness-Nursing    O2 Sat at Rest:     %   O2 Sat with Exertion: SPO2% Ambulation on Oxygen: 88  % on Ambulation oxygen flow (liters per minute): 15  liters   Temperature max from last 24 hrs: Temp (24hrs), Av.2 °F (36.8 °C),

## 2021-08-19 NOTE — PROGRESS NOTES
MARIE HOSPITALIST  Progress Note     Krish Quan Patient Status:  Inpatient    1970 MRN [id number]   [facility] 5NW-A Attending Farzaneh Olmedo, [address] Day # 4 PCP None Pcp     Chief Complaint: SOB    S: Patient seen and examined for input(s): PCT in the last 168 hours. Cardiac  Recent Labs   Lab 08/14/21  2226   TROP <0.045       Creatinine Kinase  No results for input(s): CK in the last 168 hours.     Inflammatory Markers  Recent Labs   Lab 08/15/21  1650 08/15/21  1650 08/16/2

## 2021-08-19 NOTE — PLAN OF CARE
COVID-19 Daily Discharge Readiness-Nursing    O2 Sat at Rest:  88-93%   O2 Sat with Exertion: SPO2% Ambulation on Oxygen: 80  % on Ambulation oxygen flow (liters per minute): 15  liters   Temperature max from last 24 hrs: Temp (24hrs), Av.1 °F (36.7 °C Achieves optimal ventilation and oxygenation  Description: INTERVENTIONS:  - Assess for changes in respiratory status  - Assess for changes in mentation and behavior  - Position to facilitate oxygenation and minimize respiratory effort  - Oxygen supplement

## 2021-08-19 NOTE — PROGRESS NOTES
08/19/21 1037   Mobility   O2 walk?  Yes   SPO2% on Oxygen at Rest 88   At rest oxygen flow (liters per minute) 4   SPO2% Ambulation on Oxygen 80   Ambulation oxygen flow (liters per minute) 15

## 2021-08-19 NOTE — PLAN OF CARE
Problem: Patient/Family Goals  Goal: Patient/Family Long Term Goal  Description: Patient's Long Term Goal: go home with no oxygen    Interventions:  - Continue to wean O2  -O2 walk  -Rem/dec  - See additional Care Plan goals for specific interventions  Out

## 2021-08-19 NOTE — PAYOR COMM NOTE
--------------  CONTINUED STAY REVIEW    Payor: JOSE ZUNIGA  Subscriber #:  [id number]  Authorization Number: [id number]    Admit date: 8/15/21  Admit time:  2:23 AM    FAXING CLINICAL UPDATE FOR 8/18/21- 8/19/21 8/18/21   Chief Complaint: SOB     S: Jamarcus Linear Exam:    Respiratory: Diminished bilaterally. Cardiovascular: S1, S2. Regular rate and rhythm. No murmurs, rubs or gallops. Abdomen: Soft, nontender, nondistended. Positive bowel sounds. No rebound or guarding.   Neurologic: No focal neurological defic IVPB     Date Action Dose Route     8/18/2021 2001 New Bag 100 mg Intravenous       Vitamin D3 (Cholecalciferol) tab 2,000 Units     Date Action Dose Route     8/19/2021 0843 Given 2,000 Units Oral       zinc sulfate (ZINCATE) cap 220 mg     Date Action Do

## 2021-08-20 ENCOUNTER — APPOINTMENT (OUTPATIENT)
Dept: CT IMAGING | Facility: HOSPITAL | Age: 51
DRG: 177 | End: 2021-08-20
Attending: HOSPITALIST
Payer: COMMERCIAL

## 2021-08-20 LAB
ALBUMIN SERPL-MCNC: 3 G/DL (ref 3.4–5)
ALBUMIN/GLOB SERPL: 0.8 {RATIO} (ref 1–2)
ALP LIVER SERPL-CCNC: 38 U/L
ALT SERPL-CCNC: 32 U/L
ANION GAP SERPL CALC-SCNC: 1 MMOL/L (ref 0–18)
APTT PPP: 32.4 SECONDS (ref 25.4–36.1)
AST SERPL-CCNC: 18 U/L (ref 15–37)
BILIRUB SERPL-MCNC: 0.6 MG/DL (ref 0.1–2)
BUN BLD-MCNC: 18 MG/DL (ref 7–18)
CALCIUM BLD-MCNC: 8.9 MG/DL (ref 8.5–10.1)
CHLORIDE SERPL-SCNC: 109 MMOL/L (ref 98–112)
CO2 SERPL-SCNC: 28 MMOL/L (ref 21–32)
CREAT BLD-MCNC: 0.84 MG/DL
CRP SERPL-MCNC: 5.12 MG/DL (ref ?–0.3)
D-DIMER: 2.98 UG/ML FEU (ref ?–0.5)
DEPRECATED HBV CORE AB SER IA-ACNC: 1303.3 NG/ML
GLOBULIN PLAS-MCNC: 3.8 G/DL (ref 2.8–4.4)
GLUCOSE BLD-MCNC: 113 MG/DL (ref 70–99)
LDH SERPL L TO P-CCNC: 318 U/L
M PROTEIN MFR SERPL ELPH: 6.8 G/DL (ref 6.4–8.2)
OSMOLALITY SERPL CALC.SUM OF ELEC: 289 MOSM/KG (ref 275–295)
POTASSIUM SERPL-SCNC: 4.6 MMOL/L (ref 3.5–5.1)
SODIUM SERPL-SCNC: 138 MMOL/L (ref 136–145)

## 2021-08-20 PROCEDURE — 74177 CT ABD & PELVIS W/CONTRAST: CPT | Performed by: HOSPITALIST

## 2021-08-20 PROCEDURE — 71275 CT ANGIOGRAPHY CHEST: CPT | Performed by: HOSPITALIST

## 2021-08-20 PROCEDURE — 99233 SBSQ HOSP IP/OBS HIGH 50: CPT | Performed by: HOSPITALIST

## 2021-08-20 RX ORDER — HEPARIN SODIUM AND DEXTROSE 10000; 5 [USP'U]/100ML; G/100ML
18 INJECTION INTRAVENOUS ONCE
Status: COMPLETED | OUTPATIENT
Start: 2021-08-20 | End: 2021-08-20

## 2021-08-20 RX ORDER — ENOXAPARIN SODIUM 100 MG/ML
0.5 INJECTION SUBCUTANEOUS EVERY 12 HOURS SCHEDULED
Status: DISCONTINUED | OUTPATIENT
Start: 2021-08-20 | End: 2021-08-20

## 2021-08-20 RX ORDER — HEPARIN SODIUM 5000 [USP'U]/ML
80 INJECTION INTRAVENOUS; SUBCUTANEOUS ONCE
Status: COMPLETED | OUTPATIENT
Start: 2021-08-20 | End: 2021-08-20

## 2021-08-20 RX ORDER — HEPARIN SODIUM AND DEXTROSE 10000; 5 [USP'U]/100ML; G/100ML
INJECTION INTRAVENOUS CONTINUOUS
Status: DISCONTINUED | OUTPATIENT
Start: 2021-08-21 | End: 2021-08-22

## 2021-08-20 NOTE — PAYOR COMM NOTE
--------------  CONTINUED STAY REVIEW    Payor: [facility]  Subscriber #:  [id number]  Authorization Number: Yuli Ku    Admit date: 8/15/21  Admit time:  2:23 AM    Admitting Physician: Brandon Chavez MD  Attending Physician:  Jamarcus Rg MD  Primary DISEASE PROGRESS NOTE           Juvencio Christianson [facility], [facility]. Patient Status:  Inpatient    1970 MRN [id number]   [facility] Attending Sharlene Bauer, DO   Hosp Day # 5 PCP None Pcp      Sp Radha guzman#2     Subjective: Pt seen and distress  HEENT: no scleral icterus or conjunctival injection. Moist mucous membranes. No thrush  Neck: No lymphadenopathy. Supple. Respiratory: Clear to auscultation bilaterally. No wheezes. No rhonchi.   Cardiovascular: S1, S2.  Regular rate and rhythm 08/19/21  1244 08/20/21  0456 08/20/21  1233   CRP 4.46*   < > 5.06* 5.32*  --  5.12*  --    KEITH 1,460.7*   < > 1,313.7* 1,244.6*  --  1,303.3*  --    *   < > 294* 294*  --  318*  --    DDIMER 0.32  --   --   --  1.41*  --  2.98*    < > = values in follow  Aurora Zabala MD               Electronically signed by Lucie Mayberry MD at 8/20/2021  2:32 PM    PLEASE [address]

## 2021-08-20 NOTE — PROGRESS NOTES
INFECTIOUS DISEASE PROGRESS NOTE    Maribel [facility]. Patient Status:  Inpatient    1970 MRN [id number]   [facility] 5NW-A Attending Artis Rosa,    Hosp Day # 5 PCP None Pcp     Sp Radha guzman#2    Subjective: Pt seen distress  HEENT: no scleral icterus or conjunctival injection. Moist mucous membranes. No thrush  Neck: No lymphadenopathy. Supple. Respiratory: Clear to auscultation bilaterally. No wheezes. No rhonchi.   Cardiovascular: S1, S2.  Regular rate and rhythm < > 5.06* 5.32*  --  5.12*  --    KEITH 1,460.7*   < > 1,313.7* 1,244.6*  --  1,303.3*  --    *   < > 294* 294*  --  318*  --    DDIMER 0.32  --   --   --  1.41*  --  2.98*    < > = values in this interval not displayed.        Microbiology    Reviewed

## 2021-08-20 NOTE — PROGRESS NOTES
MARIE HOSPITALIST  Progress Note     Jericho Vasques Patient Status:  Inpatient    1970 MRN [id number]   [facility] 5NW-A Attending Samuel Hewitt, [address] [facility] Day # 5 PCP None Pcp     Chief Complaint: SOB    S: Patient seen and examined Detected (A) 08/14/2021       Pro-Calcitonin  No results for input(s): PCT in the last 168 hours. Cardiac  Recent Labs   Lab 08/14/21  2226   TROP <0.045       Creatinine Kinase  No results for input(s): CK in the last 168 hours.     Inflammatory Markers care discussed with patient and RN. Camryn Bello MD    ADDENDUM:  CTA w/ acute bl small PE, L common fem DVT. No diverticulitis or obstruction or explanation for abd pain, possibly from DVT?   Start heparin gtt, DC lovenox  Ok to hold actemra as suspec

## 2021-08-20 NOTE — COVID NURSING ASSESSMENT
COVID-19 Daily Discharge Readiness-Nursing    O2 Sat at Rest:    95 % on 3L at rest   O2 Sat with Exertion: SPO2% Ambulation on Oxygen: 80  % on Ambulation oxygen flow (liters per minute): 15  liters   Temperature max from last 24 hrs: Temp (24hrs), Av

## 2021-08-20 NOTE — PROGRESS NOTES
INFECTIOUS DISEASE PROGRESS NOTE    Radha Cuenca [facility]. Patient Status:  Inpatient    1970 MRN [id number]   [facility] 5NW-A Attending Lilly Sylvester DO   Hosp Day # 4 PCP None Pcp     Remdesivir d#5  Dexa      Subjective: Pt not examined source Oral, resp. rate 18, height 6' (1.829 m), weight 207 lb 9.6 oz (94.2 kg), SpO2 90 %.   Alert and oriented      Laboratory Data:    Recent Labs   Lab 08/15/21  1650 08/16/21  0844 08/17/21  0538   RBC 4.51 4.62 4.20*   HGB 13.6 14.3 12.8*   HCT 42.0 4 XR CHEST AP PORTABLE  (CPT=71045)  TECHNIQUE:  AP chest radiograph was obtained. COMPARISON:  None.   INDICATIONS:  LIGHTHEADED, COVID, LOW PULSE OX  PATIENT STATED HISTORY: (As transcribed by Technologist)  Patient was recently diagnosed with COVID on Tu

## 2021-08-20 NOTE — PROGRESS NOTES
COVID-19 Daily Discharge Readiness-Nursing    O2 Sat at Rest:  92% on 7L HFNC (drops to low 80's w/ positional changes)  O2 Sat with Exertion: SPO2% Ambulation on Oxygen: 80  % on Ambulation oxygen flow (liters per minute): 15  liters   Temperature max fro scheduled within 7 days. .. [] Transitional Care Clinic (TCC)     [] Pulmonologist     [x] Primary Care Physician     [] Other Specialty      [x] Care partner identified and updated with the plan of care.

## 2021-08-21 ENCOUNTER — APPOINTMENT (OUTPATIENT)
Dept: GENERAL RADIOLOGY | Facility: HOSPITAL | Age: 51
DRG: 177 | End: 2021-08-21
Attending: INTERNAL MEDICINE
Payer: COMMERCIAL

## 2021-08-21 ENCOUNTER — APPOINTMENT (OUTPATIENT)
Dept: ULTRASOUND IMAGING | Facility: HOSPITAL | Age: 51
DRG: 177 | End: 2021-08-21
Attending: HOSPITALIST
Payer: COMMERCIAL

## 2021-08-21 LAB
ANION GAP SERPL CALC-SCNC: 3 MMOL/L (ref 0–18)
APTT PPP: 83.1 SECONDS (ref 25.4–36.1)
APTT PPP: 90.4 SECONDS (ref 25.4–36.1)
BUN BLD-MCNC: 20 MG/DL (ref 7–18)
CALCIUM BLD-MCNC: 8.5 MG/DL (ref 8.5–10.1)
CHLORIDE SERPL-SCNC: 108 MMOL/L (ref 98–112)
CO2 SERPL-SCNC: 27 MMOL/L (ref 21–32)
CREAT BLD-MCNC: 0.81 MG/DL
ERYTHROCYTE [DISTWIDTH] IN BLOOD BY AUTOMATED COUNT: 12.9 %
GLUCOSE BLD-MCNC: 102 MG/DL (ref 70–99)
HCT VFR BLD AUTO: 41.7 %
HGB BLD-MCNC: 13.9 G/DL
MCH RBC QN AUTO: 29.9 PG (ref 26–34)
MCHC RBC AUTO-ENTMCNC: 33.3 G/DL (ref 31–37)
MCV RBC AUTO: 89.7 FL
OSMOLALITY SERPL CALC.SUM OF ELEC: 289 MOSM/KG (ref 275–295)
PLATELET # BLD AUTO: 398 10(3)UL (ref 150–450)
PLATELET # BLD AUTO: 398 10(3)UL (ref 150–450)
POTASSIUM SERPL-SCNC: 4.6 MMOL/L (ref 3.5–5.1)
PROCALCITONIN SERPL-MCNC: 0.15 NG/ML (ref ?–0.16)
RBC # BLD AUTO: 4.65 X10(6)UL
SODIUM SERPL-SCNC: 138 MMOL/L (ref 136–145)
TSI SER-ACNC: 0.43 MIU/ML (ref 0.36–3.74)
WBC # BLD AUTO: 9.7 X10(3) UL (ref 4–11)

## 2021-08-21 PROCEDURE — 5A0955Z ASSISTANCE WITH RESPIRATORY VENTILATION, GREATER THAN 96 CONSECUTIVE HOURS: ICD-10-PCS | Performed by: INTERNAL MEDICINE

## 2021-08-21 PROCEDURE — 71045 X-RAY EXAM CHEST 1 VIEW: CPT | Performed by: INTERNAL MEDICINE

## 2021-08-21 PROCEDURE — 93970 EXTREMITY STUDY: CPT | Performed by: HOSPITALIST

## 2021-08-21 PROCEDURE — 99233 SBSQ HOSP IP/OBS HIGH 50: CPT | Performed by: HOSPITALIST

## 2021-08-21 RX ORDER — DEXAMETHASONE SODIUM PHOSPHATE 4 MG/ML
6 VIAL (ML) INJECTION EVERY 12 HOURS
Status: DISCONTINUED | OUTPATIENT
Start: 2021-08-21 | End: 2021-08-24

## 2021-08-21 RX ORDER — ALPRAZOLAM 0.5 MG/1
0.5 TABLET ORAL EVERY 4 HOURS PRN
Status: DISCONTINUED | OUTPATIENT
Start: 2021-08-21 | End: 2021-08-28

## 2021-08-21 NOTE — COVID NURSING ASSESSMENT
COVID-19 Daily Discharge Readiness-Nursing    O2 Sat at Rest:    93 % on vapotherm 30L 60% Fio2   O2 Sat with Exertion: 88-90% on vapotherm 40L 100% Fio2    Temperature max from last 24 hrs: Temp (24hrs), Av.4 °F (36.9 °C), Min:97.7 °F (36.5 °C), Max:9

## 2021-08-21 NOTE — PROGRESS NOTES
MARIE HOSPITALIST  Progress Note     Regine Mckoy Patient Status:  Inpatient    1970 MRN [id number]   [facility] 5NW-A Attending Tammy Colindres DO   Hosp Day # 6 PCP None Pcp     Chief Complaint: SOB    S: pt worse today, breathing interval not displayed. Estimated Creatinine Clearance: 119.8 mL/min (based on SCr of 0.81 mg/dL). No results for input(s): PTP, INR in the last 168 hours.          COVID-19 Lab Results    COVID-19  Lab Results   Component Value Date    COVID19 Det Prophylaxis: lovenox  · CODE status: full  · Damon: no  · Central line: no    Will the patient be referred to TCC on discharge?: no  Estimated date of discharge: tbd  Discharge is dependent on: clinical progress  At this point Mr. eHlm is expected to be

## 2021-08-21 NOTE — PROGRESS NOTES
INFECTIOUS DISEASE PROGRESS NOTE    Portia Kline [facility], [facility]. Patient Status:  Inpatient    1970 MRN [id number]   [facility] 5NW-A Attending Glen Valera, DO   Hosp Day # 6 PCP None Pcp     S/p Remdesivir  Dexamethasone  Baricitinib day #3 rash    Laboratory Data:    Recent Labs   Lab 08/15/21  1650 08/15/21  1650 08/16/21  0844 08/17/21  0538 08/21/21  0517   RBC 4.51   < > 4.62 4.20* 4.65   HGB 13.6   < > 14.3 12.8* 13.9   HCT 42.0   < > 42.0 38.3* 41.7   MCV 93.1   < > 90.9 91.2 89.7   MC 1,460.7*   < > 1,313.7* 1,244.6*  --  1,303.3*  --    *   < > 294* 294*  --  318*  --    DDIMER 0.32  --   --   --  1.41*  --  2.98*    < > = values in this interval not displayed.        Microbiology    Reviewed in EMR,   No results found for this v

## 2021-08-21 NOTE — COVID NURSING ASSESSMENT
COVID-19 Daily Discharge Readiness-Nursing    O2 Sat at Rest:    94% on 8L.   O2 Sat with Exertion: SPO2% Ambulation on Oxygen: 80  % on Ambulation oxygen flow (liters per minute): 15  liters   Temperature max from last 24 hrs: Temp (24hrs), Av °F (36.7

## 2021-08-21 NOTE — CONSULTS
[facility] Consult Note  [facility]  Report of Consultation    [address] Patient Status:  Inpatient    1970 MRN [id number]   [facility] 5NW-A Attending Anthony Hardin MD   [facility] Day # 6 snoring, sore throat, hoarseness and voice change. Respiratory: intermittent dyspnea  Cardiovascular: Negative for chest pain, palpitations, irregular heart beats, syncope, fatigue, orthopnea, paroxysmal nocturnal dyspnea, lower extremity edema.   Terrell [employer] dentition  NECK: Trachea midline, symmetric, no visible masses or scars, no crepitus, normal flexion/extension  CHEST: Normal chest excursion, no visible deformity or scars, no tenderness to palpation  PULMONARY:few rales bilaterally posteriorly  COR: rrr input(s): [id number], CHOB    Cultures:     No results found for this visit on 08/14/21.   No results for input(s): Sumi Malik, [address] [facility], [address], [address], [address], [address] [facility], [address] [facility], [facility] [address], [address], [address], [place], [address], [place] [address], [facility] in the could be from covid pna/ PE combination. Follow  · Continue heparin ggt for bilateral PE. Consider swtich to LMWH  · D/w rn/ hospitalist    Thank you for the consultation. Will follow with you.     Roberto Higgins MD

## 2021-08-21 NOTE — RESPIRATORY THERAPY NOTE
Pt on right side 15L HFNC with oxygen saturation at 99% when entering room, RN notes that Pt oxygen saturation dips down to 70s when doing any activities. Pt placed on vapotherm 30L/50% per RN due to oxygenation problems when moving.  Will replace O2 monito

## 2021-08-22 LAB
APTT PPP: 206 SECONDS (ref 25.4–36.1)
D-DIMER: 1.24 UG/ML FEU (ref ?–0.5)
DEPRECATED HBV CORE AB SER IA-ACNC: 1971 NG/ML
PLATELET # BLD AUTO: 466 10(3)UL (ref 150–450)

## 2021-08-22 PROCEDURE — 99233 SBSQ HOSP IP/OBS HIGH 50: CPT | Performed by: HOSPITALIST

## 2021-08-22 RX ORDER — ENOXAPARIN SODIUM 100 MG/ML
1 INJECTION SUBCUTANEOUS EVERY 12 HOURS SCHEDULED
Status: DISCONTINUED | OUTPATIENT
Start: 2021-08-22 | End: 2021-08-28

## 2021-08-22 NOTE — PROGRESS NOTES
Found PT on 30L and 60%      08/21/21 1930   Oxygen Utilization   $ RT Standby Charge (per 15 min) 1   O2 Device High flow/High humidity   O2 Flow Rate (L/min) 30 L/min   FiO2 (%) 60 %   SpO2 91 %   Humidity High   Isle Filled

## 2021-08-22 NOTE — PROGRESS NOTES
[facility] Pulmonary/Critical Care Progress Note     SUBJECTIVE/Interval history: All events, procedures, notes reviewed. Pt has mild difficulty in taking a deep breath in while proning.        Review of Systems:   A com Recent Labs   Lab 08/15/21  1650 08/15/21  1650 08/16/21  0844 08/16/21  0844 08/17/21  0538 08/21/21  0517 08/22/21  0635   RBC 4.51   < > 4.62  --  4.20* 4.65  --    HGB 13.6   < > 14.3  --  12.8* 13.9  --    HCT 42.0   < > 42.0  --  38.3* 41.7  -- - DIAG IMG (IZV=19856)    Result Date: 8/21/2021  CONCLUSION:  Negative for DVT. Calf veins are not imaged per COVID protocol.   2.6 x 0.5 x 1.8 cm Baker cyst.    Dictated by (CST): Lisa Miles MD on 8/21/2021 at 7:42 AM     Finalized by (CST): Arnaldo Lima, over phone  · Dispo: Ghulam Santana MD

## 2021-08-22 NOTE — PROGRESS NOTES
[facility]PITAL[facility]  Progress Note     Melissa Mccarthy Patient Status:  Inpatient    1970 MRN [id number]   [facility] 5NW-A Attending Jalil Baugh, [address] [facility] Day # 7 PCP None Pcp     Chief Complaint: SOB    S: pt appears much better to Estimated Creatinine Clearance: 119.8 mL/min (based on SCr of 0.81 mg/dL). No results for input(s): PTP, INR in the last 168 hours.          COVID-19 Lab Results    COVID-19  Lab Results   Component Value Date    COVID19 Detected (A) 08/14/2021 needed  6. Essential HTN  1.  ARB    Quality:  · DVT Prophylaxis: lovenox  · CODE status: full  · Damon: no  · Central line: no    Will the patient be referred to TCC on discharge?: no  Estimated date of discharge: tbd  Discharge is dependent on: clinical p

## 2021-08-22 NOTE — PLAN OF CARE
COVID-19 Daily Discharge Readiness-Nursing    O2 Sat at Rest:    95 % Vapotherm 30L 50% FiO2   O2 Sat with Exertion: SPO2% Ambulation on Oxygen: 81  % on Ambulation oxygen flow (liters per minute): 40  liters   Temperature max from last 24 hrs: Temp (24hrs prone  8/19 AM: wean O2 as tolerated  8/21 AM: wean O2  8/21 NOC: wean O2, prone    Interventions:   - , wean O2  - IS and Flutter valve  - See additional Care Plan goals for specific interventions  Outcome: Progressing     Problem: RESPIRATORY - ADULT

## 2021-08-22 NOTE — PLAN OF CARE
COVID-19 Daily Discharge Readiness-Nursing    O2 Sat at Rest:     %   O2 Sat with Exertion: SPO2% Ambulation on Oxygen: 80  % on Ambulation oxygen flow (liters per minute): 15  liters   Temperature max from last 24 hrs: Temp (24hrs), Av.5 °F (36.9 °C), home with no oxygen    Interventions:  - Continue to wean O2  -O2 walk  -Rem/dec  - See additional Care Plan goals for specific interventions  Outcome: Progressing  Goal: Patient/Family Short Term Goal  Description: Patient's Short Term Goal:   8/16 AM: cristine

## 2021-08-22 NOTE — PROGRESS NOTES
08/21/21 1742   Oxygen Utilization   $ RT Standby Charge (per 15 min) 1   O2 Device High flow/High humidity   O2 Flow Rate (L/min) 40 L/min   FiO2 (%) 100 %   SpO2 91 %   Humidity High   [place] 1/2 Full

## 2021-08-23 ENCOUNTER — APPOINTMENT (OUTPATIENT)
Dept: CV DIAGNOSTICS | Facility: HOSPITAL | Age: 51
DRG: 177 | End: 2021-08-23
Attending: INTERNAL MEDICINE
Payer: COMMERCIAL

## 2021-08-23 LAB
ANION GAP SERPL CALC-SCNC: 5 MMOL/L (ref 0–18)
BASOPHILS # BLD: 0 X10(3) UL (ref 0–0.2)
BASOPHILS NFR BLD: 0 %
BUN BLD-MCNC: 23 MG/DL (ref 7–18)
CALCIUM BLD-MCNC: 9.4 MG/DL (ref 8.5–10.1)
CHLORIDE SERPL-SCNC: 108 MMOL/L (ref 98–112)
CO2 SERPL-SCNC: 25 MMOL/L (ref 21–32)
CREAT BLD-MCNC: 0.83 MG/DL
CRP SERPL-MCNC: 3.66 MG/DL (ref ?–0.3)
D-DIMER: 0.84 UG/ML FEU (ref ?–0.5)
DEPRECATED HBV CORE AB SER IA-ACNC: 1520.9 NG/ML
EOSINOPHIL # BLD: 0 X10(3) UL (ref 0–0.7)
EOSINOPHIL NFR BLD: 0 %
ERYTHROCYTE [DISTWIDTH] IN BLOOD BY AUTOMATED COUNT: 13.1 %
GLUCOSE BLD-MCNC: 117 MG/DL (ref 70–99)
HCT VFR BLD AUTO: 44.5 %
HGB BLD-MCNC: 14.5 G/DL
IL6 SERPL-MCNC: 10.9 PG/ML (ref ?–4.4)
LYMPHOCYTES NFR BLD: 0.57 X10(3) UL (ref 1–4)
LYMPHOCYTES NFR BLD: 5 %
MCH RBC QN AUTO: 29.8 PG (ref 26–34)
MCHC RBC AUTO-ENTMCNC: 32.6 G/DL (ref 31–37)
MCV RBC AUTO: 91.6 FL
MONOCYTES # BLD: 0.68 X10(3) UL (ref 0.1–1)
MONOCYTES NFR BLD: 6 %
MORPHOLOGY: NORMAL
NEUTROPHILS # BLD AUTO: 9.75 X10 (3) UL (ref 1.5–7.7)
NEUTROPHILS NFR BLD: 88 %
NEUTS BAND NFR BLD: 1 %
NEUTS HYPERSEG # BLD: 10.06 X10(3) UL (ref 1.5–7.7)
OSMOLALITY SERPL CALC.SUM OF ELEC: 291 MOSM/KG (ref 275–295)
PLATELET # BLD AUTO: 496 10(3)UL (ref 150–450)
PLATELET MORPHOLOGY: NORMAL
POTASSIUM SERPL-SCNC: 4.9 MMOL/L (ref 3.5–5.1)
RBC # BLD AUTO: 4.86 X10(6)UL
SODIUM SERPL-SCNC: 138 MMOL/L (ref 136–145)
TOTAL CELLS COUNTED: 100
WBC # BLD AUTO: 11.3 X10(3) UL (ref 4–11)

## 2021-08-23 PROCEDURE — 93306 TTE W/DOPPLER COMPLETE: CPT | Performed by: INTERNAL MEDICINE

## 2021-08-23 PROCEDURE — 99233 SBSQ HOSP IP/OBS HIGH 50: CPT | Performed by: HOSPITALIST

## 2021-08-23 PROCEDURE — XW033H5 INTRODUCTION OF TOCILIZUMAB INTO PERIPHERAL VEIN, PERCUTANEOUS APPROACH, NEW TECHNOLOGY GROUP 5: ICD-10-PCS | Performed by: INTERNAL MEDICINE

## 2021-08-23 RX ORDER — ACYCLOVIR 400 MG/1
400 TABLET ORAL 2 TIMES DAILY
Status: DISCONTINUED | OUTPATIENT
Start: 2021-08-23 | End: 2021-08-28

## 2021-08-23 NOTE — PROGRESS NOTES
MARIE HOSPITALIST  Progress Note     Alicia Minus Patient Status:  Inpatient    1970 MRN [id number]   [facility] 5NW-A Attending Yael Montoya, [address] [facility] Day # 8 PCP None Pcp     Chief Complaint: SOB    S: pt appears much better to ALKPHO 34*  --  34*  --  38*  --   --    AST 21  --  18  --  18  --   --    ALT 31  --  32  --  32  --   --    BILT 0.6  --  0.7  --  0.6  --   --    TP 6.3*  --  6.8  --  6.8  --   --     < > = values in this interval not displayed.        Estimated Crea following  8. Pt's wife requesting dr Nico Lubin to see, will switch consults. Notified dr Marty Connelly  2. Acute bl PE  3. Acute L common fem DVT  1. Hep gtt  2.  Interestingly, small L DVT noted on CTA but not on dopplers, may have migrated to lungs causing his w

## 2021-08-23 NOTE — PROGRESS NOTES
[facility] INFECTIOUS DISEASE TELEMEDICINE PROGRESS NOTE    Karlos [facility], [facility]. Patient Status:  Inpatient    1970 MRN [id number]   [facility] 5NW-A Attending Santos Hernandez, DO   Hosp Day # 8 PCP None Pcp     S/p Radha Culp and oriented.   On high flow O2    Physical examination deferred    Laboratory Data:    Recent Labs   Lab 08/17/21  0538 08/17/21  0538 08/21/21  0517 08/22/21  0635 08/23/21  0645   RBC 4.20*  --  4.65  --  4.86   HGB 12.8*  --  13.9  --  14.5   HCT 38.3* 08/21/21  0517   PCT 0.15       Cardiac  No results for input(s): TROP, PBNP in the last 168 hours. Creatinine Kinase  No results for input(s): CK in the last 168 hours.     Inflammatory Markers  Recent Labs   Lab 08/18/21  0541 08/18/21  0541 08/19/21 Acyclovir for HSV/VZV prophylaxis after Tocilizumab  - Continue higher dose of Dexamethasone  - may need transfer to ICU  - wean O2 as able  - monitor for improvement  - anticoagulation per primary service    Sam Alston MD  [facility] INFECTIOUS DISEASE CONS

## 2021-08-23 NOTE — CONSULTS
Pulmonary / Critical Care H&P/Consult       NAME: [address] [place] [address] Avenue: [address] - MRN: [id number] - Age: 49year old - :  1970    Date of Admission: 2021  9:27 PM  Admission Diagnosis: Hypoxia [R09.02]  Pneumonia due to COVID-19 virus [U07.   Worried About [address] [facility] in the Last Year:       Ran Out of Food in the Last Year:   Transportation Needs:       Lack of Transportation (Medical):       Lack of Transportation (Non-Medical):   Physical Activity:       Days of Exercise per Week: HPI    OBJECTIVE:   08/23/21  0553 08/23/21  0845 08/23/21  1040 08/23/21  1152   BP: 102/47 115/70  106/65   BP Location: Left arm Left arm  Right arm   Pulse: 64 62  59   Resp: 18 20  20   Temp: 97.7 °F (36.5 °C) 97.6 °F (36.4 °C)  97.6 °F (36.4 °C)   Te = values in this interval not displayed. No results for input(s): INR in the last 168 hours.       Recent Labs   Lab 08/20/21  0456 08/21/21  0517 08/23/21  0645    138 138   K 4.6 4.6 4.9    108 108   CO2 28.0 27.0 25.0   BUN 18 20* 23* Pt is agreeable to treatment plan as outlined above. 3. PE: small lingular PE noted on ct angio 8/20  - on treatment dose lmwh  - eventual transition to doac prior to discharge.     - will check stat echo given discrepency on possible LE DVT on ct vs LE do

## 2021-08-23 NOTE — PAYOR COMM NOTE
--------------  CONTINUED STAY REVIEW    Payor: JOSE ZUNIGA  Subscriber #:  [id number]  Authorization Number: [id number]    Admit date: 8/15/21  Admit time:  2:23 AM    FAXING CLINICAL UPDATE FOR 8/21/21- 8/22/21 8/21/21   Chief Complaint: SOB     S: pt signs: Temp:  [97.9 °F (36.6 °C)-98.9 °F (37.2 °C)] 97.9 °F (36.6 °C)  Pulse:  [45-70] 69  Resp:  [18-20] 18  BP: ()/(54-69) 96/64  FiO2 (%):  [55 %-100 %] 100 %  Physical Exam:    Respiratory: Diminished bilaterally.    Cardiovascular: S1, S2. Regul Date Action Dose Route     8/22/2021 0928 Hi-Risk Rate/Dose Change 1,450 Units/hr Intravenous       dexamethasone Sodium Phosphate (DECADRON) 4 MG/ML injection 6 mg     Date Action Dose Route     8/22/2021 2048 Given 6 mg Intravenous       enoxaparin (

## 2021-08-24 LAB
ALBUMIN SERPL-MCNC: 2.9 G/DL (ref 3.4–5)
ALBUMIN/GLOB SERPL: 0.8 {RATIO} (ref 1–2)
ALP LIVER SERPL-CCNC: 35 U/L
ALT SERPL-CCNC: 29 U/L
ANION GAP SERPL CALC-SCNC: 6 MMOL/L (ref 0–18)
AST SERPL-CCNC: 11 U/L (ref 15–37)
BASOPHILS # BLD AUTO: 0.03 X10(3) UL (ref 0–0.2)
BASOPHILS NFR BLD AUTO: 0.2 %
BILIRUB SERPL-MCNC: 0.6 MG/DL (ref 0.1–2)
BUN BLD-MCNC: 25 MG/DL (ref 7–18)
CALCIUM BLD-MCNC: 8.9 MG/DL (ref 8.5–10.1)
CHLORIDE SERPL-SCNC: 107 MMOL/L (ref 98–112)
CO2 SERPL-SCNC: 22 MMOL/L (ref 21–32)
CREAT BLD-MCNC: 0.74 MG/DL
CRP SERPL-MCNC: 1.86 MG/DL (ref ?–0.3)
D-DIMER: 0.78 UG/ML FEU (ref ?–0.5)
DEPRECATED HBV CORE AB SER IA-ACNC: 1497.8 NG/ML
EOSINOPHIL # BLD AUTO: 0 X10(3) UL (ref 0–0.7)
EOSINOPHIL NFR BLD AUTO: 0 %
ERYTHROCYTE [DISTWIDTH] IN BLOOD BY AUTOMATED COUNT: 12.8 %
GLOBULIN PLAS-MCNC: 3.5 G/DL (ref 2.8–4.4)
GLUCOSE BLD-MCNC: 104 MG/DL (ref 70–99)
HAV IGM SER QL: 2.7 MG/DL (ref 1.6–2.6)
HCT VFR BLD AUTO: 42.2 %
HGB BLD-MCNC: 14.2 G/DL
IMM GRANULOCYTES # BLD AUTO: 0.34 X10(3) UL (ref 0–1)
IMM GRANULOCYTES NFR BLD: 2.7 %
INR BLD: 1.15 (ref 0.89–1.11)
LDH SERPL L TO P-CCNC: 267 U/L
LYMPHOCYTES # BLD AUTO: 0.34 X10(3) UL (ref 1–4)
LYMPHOCYTES NFR BLD AUTO: 2.7 %
M PROTEIN MFR SERPL ELPH: 6.4 G/DL (ref 6.4–8.2)
MCH RBC QN AUTO: 30.5 PG (ref 26–34)
MCHC RBC AUTO-ENTMCNC: 33.6 G/DL (ref 31–37)
MCV RBC AUTO: 90.6 FL
MONOCYTES # BLD AUTO: 0.61 X10(3) UL (ref 0.1–1)
MONOCYTES NFR BLD AUTO: 4.8 %
NEUTROPHILS # BLD AUTO: 11.46 X10 (3) UL (ref 1.5–7.7)
NEUTROPHILS # BLD AUTO: 11.46 X10(3) UL (ref 1.5–7.7)
NEUTROPHILS NFR BLD AUTO: 89.6 %
OSMOLALITY SERPL CALC.SUM OF ELEC: 285 MOSM/KG (ref 275–295)
PLATELET # BLD AUTO: 468 10(3)UL (ref 150–450)
POTASSIUM SERPL-SCNC: 4.3 MMOL/L (ref 3.5–5.1)
PSA SERPL DL<=0.01 NG/ML-MCNC: 15 SECONDS (ref 12.2–14.5)
RBC # BLD AUTO: 4.66 X10(6)UL
SODIUM SERPL-SCNC: 135 MMOL/L (ref 136–145)
WBC # BLD AUTO: 12.8 X10(3) UL (ref 4–11)

## 2021-08-24 PROCEDURE — 99232 SBSQ HOSP IP/OBS MODERATE 35: CPT | Performed by: HOSPITALIST

## 2021-08-24 NOTE — PLAN OF CARE
COVID-19 Daily Discharge Readiness-Nursing    O2 Sat at Rest:     93% on 30L/55%    O2 Sat with Exertion: SPO2% Ambulation on Oxygen: 81  % on Ambulation oxygen flow (liters per minute): 40  liters, requiring 40L/100 FIO2%  Temperature max from last 24 hrs up.    [x] Care partner identified and updated with the plan of care.     Problem: Patient/Family Goals  Goal: Patient/Family Long Term Goal  Description: Patient's Long Term Goal: go home with no oxygen    Interventions:  - Continue to wean O2  -O2 walk  -

## 2021-08-24 NOTE — PROGRESS NOTES
COVID-19 Daily Discharge Readiness-Nursing    O2 Sat at Rest:    30 L with 60% fiO2  O2 Sat with Exertion: 30 L with 60% fiO2  Temperature max from last 24 hrs: Temp (24hrs), Av °F (36.7 °C), Min:97.5 °F (36.4 °C), Max:98.6 °F (37 °C)    Inflammatory M

## 2021-08-24 NOTE — PROGRESS NOTES
COVID-19 Daily Discharge Readiness-Nursing    O2 Sat at Rest: 91% on 35L @ 80% FiO2  O2 Sat with Exertion: SPO2% Ambulation on Oxygen: 81  % on Ambulation oxygen flow (liters per minute): 40  liters   Temperature max from last 24 hrs: Temp (24hrs), Av. partner identified and updated with the plan of care.

## 2021-08-24 NOTE — PROGRESS NOTES
MARIE HOSPITALIST  Progress Note     Marlen Herrera Patient Status:  Inpatient    1970 MRN [id number]   [facility] 5NW-A Attending Inés Mcallister, [address] [facility] Day # 9 PCP None Pcp     Chief Complaint: SOB    S: pt appears much better to 22.0   ALKPHO 34*  --  38*  --   --   --  35*   AST 18  --  18  --   --   --  11*   ALT 32  --  32  --   --   --  29   BILT 0.7  --  0.6  --   --   --  0.6   TP 6.8  --  6.8  --   --   --  6.4    < > = values in this interval not displayed.        Estimated strain  4. LLQ abd pain  1. Resolved, no cause noted on CT a/p  5. Anxiety  1. Very anxious, can increase xanax to 0.5. can increase further to 1 mg if needed  6. Essential HTN  1.  ARB    Quality:  · DVT Prophylaxis: lovenox  · CODE status: full  · Damon:

## 2021-08-24 NOTE — PROGRESS NOTES
[facility] INFECTIOUS DISEASE TELEMEDICINE PROGRESS NOTE    [facility], [facility]. Patient Status:  Inpatient    1970 MRN [id number]   [facility] 5NW-A Attending Darrel Miller, DO   Hosp Day # 9 PCP None Pcp     S/p Remdesivir  Dexamethasone  Sp B O2    Physical examination deferred    Laboratory Data:    Recent Labs   Lab 08/21/21  0517 08/21/21  0517 08/22/21  0635 08/23/21  0645 08/24/21  0734   RBC 4.65  --   --  4.86 4.66   HGB 13.9  --   --  14.5 14.2   HCT 41.7  --   --  44.5 42.2   MCV 89.7 Cardiac  No results for input(s): TROP, PBNP in the last 168 hours. Creatinine Kinase  No results for input(s): CK in the last 168 hours.     Inflammatory Markers  Recent Labs   Lab 08/19/21  0635 08/19/21  1244 08/20/21  0456 08/20/21  1233 08/22/ wean O2 as able  - encourage proning and IS  D/w pt over the phone  Will follow    Marlin Joy MD

## 2021-08-24 NOTE — DIETARY NOTE
[address]     Admitting diagnosis:  Hypoxia [R09.02]  Pneumonia due to COVID-19 virus [U07.1, J12.82]    Ht: 182.9 cm (6')  Wt: 96.9 kg (213 lb 9.6 oz). Body mass index is 28.97 kg/m².   IBW: 81kg  Wt [place]

## 2021-08-24 NOTE — PROGRESS NOTES
[address] [facility] Patient Status:  Inpatient    1970 MRN [id number]   [facility] 5NW-A Attending Slade Mcintyre MD   Hosp Day # 9 PCP None Pcp     Pulm / Critical Care Progress Note     S: overall he is feeling better. = values in this interval not displayed.      Recent Labs   Lab 08/24/21  0734   INR 1.15*         Recent Labs   Lab 08/21/21  0517 08/23/21  0645 08/24/21  0734    138 135*   K 4.6 4.9 4.3    108 107   CO2 27.0 25.0 22.0   BUN 20* 23* 25*     C risks. Pt is agreeable to treatment plan as outlined above. 3. PE: small lingular PE noted on ct angio 8/20  - on treatment dose lmwh  - eventual transition to doac prior to discharge. - echo without signs of worsening clot burden    Will follow.  Pt's

## 2021-08-25 LAB
ALBUMIN SERPL-MCNC: 2.9 G/DL (ref 3.4–5)
ALBUMIN/GLOB SERPL: 0.8 {RATIO} (ref 1–2)
ALP LIVER SERPL-CCNC: 35 U/L
ALT SERPL-CCNC: 38 U/L
ANION GAP SERPL CALC-SCNC: 2 MMOL/L (ref 0–18)
AST SERPL-CCNC: 18 U/L (ref 15–37)
BASOPHILS # BLD AUTO: 0.01 X10(3) UL (ref 0–0.2)
BASOPHILS NFR BLD AUTO: 0.1 %
BILIRUB SERPL-MCNC: 0.6 MG/DL (ref 0.1–2)
BUN BLD-MCNC: 25 MG/DL (ref 7–18)
CALCIUM BLD-MCNC: 8.8 MG/DL (ref 8.5–10.1)
CHLORIDE SERPL-SCNC: 106 MMOL/L (ref 98–112)
CO2 SERPL-SCNC: 26 MMOL/L (ref 21–32)
CREAT BLD-MCNC: 0.79 MG/DL
CRP SERPL-MCNC: 1.07 MG/DL (ref ?–0.3)
D-DIMER: 0.67 UG/ML FEU (ref ?–0.5)
DEPRECATED HBV CORE AB SER IA-ACNC: 1533.2 NG/ML
EOSINOPHIL # BLD AUTO: 0.03 X10(3) UL (ref 0–0.7)
EOSINOPHIL NFR BLD AUTO: 0.3 %
ERYTHROCYTE [DISTWIDTH] IN BLOOD BY AUTOMATED COUNT: 12.7 %
GLOBULIN PLAS-MCNC: 3.5 G/DL (ref 2.8–4.4)
GLUCOSE BLD-MCNC: 80 MG/DL (ref 70–99)
HAV IGM SER QL: 2.6 MG/DL (ref 1.6–2.6)
HCT VFR BLD AUTO: 44.6 %
HGB BLD-MCNC: 14.5 G/DL
IMM GRANULOCYTES # BLD AUTO: 0.23 X10(3) UL (ref 0–1)
IMM GRANULOCYTES NFR BLD: 2.4 %
LDH SERPL L TO P-CCNC: 266 U/L
LYMPHOCYTES # BLD AUTO: 0.44 X10(3) UL (ref 1–4)
LYMPHOCYTES NFR BLD AUTO: 4.6 %
M PROTEIN MFR SERPL ELPH: 6.4 G/DL (ref 6.4–8.2)
MCH RBC QN AUTO: 30 PG (ref 26–34)
MCHC RBC AUTO-ENTMCNC: 32.5 G/DL (ref 31–37)
MCV RBC AUTO: 92.1 FL
MONOCYTES # BLD AUTO: 0.62 X10(3) UL (ref 0.1–1)
MONOCYTES NFR BLD AUTO: 6.5 %
NEUTROPHILS # BLD AUTO: 8.23 X10 (3) UL (ref 1.5–7.7)
NEUTROPHILS # BLD AUTO: 8.23 X10(3) UL (ref 1.5–7.7)
NEUTROPHILS NFR BLD AUTO: 86.1 %
OSMOLALITY SERPL CALC.SUM OF ELEC: 281 MOSM/KG (ref 275–295)
PHOSPHATE SERPL-MCNC: 2.7 MG/DL (ref 2.5–4.9)
PLATELET # BLD AUTO: 446 10(3)UL (ref 150–450)
POTASSIUM SERPL-SCNC: 4.2 MMOL/L (ref 3.5–5.1)
RBC # BLD AUTO: 4.84 X10(6)UL
SODIUM SERPL-SCNC: 134 MMOL/L (ref 136–145)
WBC # BLD AUTO: 9.6 X10(3) UL (ref 4–11)

## 2021-08-25 PROCEDURE — 99232 SBSQ HOSP IP/OBS MODERATE 35: CPT | Performed by: HOSPITALIST

## 2021-08-25 NOTE — PROGRESS NOTES
COVID-19 Daily Discharge Readiness-Nursing    O2 Sat at Rest:    94 %  On 6L HFNC  O2 Sat with Exertion: SPO2% Ambulation on Oxygen: 81  % on Ambulation oxygen flow (liters per minute): 40  liters   Temperature max from last 24 hrs: Temp (24hrs), Av.9

## 2021-08-25 NOTE — PROGRESS NOTES
MARIE HOSPITALIST  Progress Note     Nick Clayemmanuel Patient Status:  Inpatient    1970 MRN [id number]   [facility] 5NW-A Attending Jewell Rawls, [address] [facility] Day # 10 PCP None Pcp     Chief Complaint: SOB    S: pt appears much better t BILT 0.6  --   --  0.6 0.6   TP 6.8  --   --  6.4 6.4    < > = values in this interval not displayed. Estimated Creatinine Clearance: 122.8 mL/min (based on SCr of 0.79 mg/dL).     Recent Labs   Lab 08/24/21  0734   PTP 15.0*   INR 1.15* HTN  1. ARB  7. Bradycardia  1. Asymptomatic. Cont to monitor.  No CM noted on echo    Quality:  · DVT Prophylaxis: lovenox  · CODE status: full  · Damon: no  · Central line: no    Will the patient be referred to TCC on discharge?: no  Estimated date of dis

## 2021-08-25 NOTE — PROGRESS NOTES
COVID-19 Daily Discharge Readiness-Nursing    O2 Sat at Rest:  >90% on 30L @ 60% FiO2  O2 Sat with Exertion: >90% on 30L @ 60% - wean to HFNC this AM per pulm note (tolerated sats well on vapo overnight)  Temperature max from last 24 hrs: Temp (24hrs), Avg care.

## 2021-08-25 NOTE — PAYOR COMM NOTE
--------------  CONTINUED STAY REVIEW------REQUESTING ADDITIONAL DAY 8/25      Payor: Vlad Sepulveda BAYRON  Subscriber #:  [id number]  Authorization Number: Castillo Emperor    Admit date: 8/15/21  Admit time:  2:23 AM    Admitting Physician: Nima Nielsen MD  Attending 4.2      < > 108 107 106   CO2 28.0   < > 25.0 22.0 26.0   ALKPHO 38*  --   --  35* 35*   AST 18  --   --  11* 18   ALT 32  --   --  29 38   BILT 0.6  --   --  0.6 0.6   TP 6.8  --   --  6.4 6.4    < > = values in this interval not displayed.        strain  4. LLQ abd pain  1. Resolved, no cause noted on CT a/p  5. Anxiety  1. Very anxious, can increase xanax to 0.5. can increase further to 1 mg if needed. better  6. Essential HTN  1. ARB  7. Bradycardia  1. Asymptomatic. Cont to monitor.  No CM noted 8/25/2021 5898 Given 6 mg Oral Antonio Shaw RN          Procedures:      Plan:

## 2021-08-25 NOTE — PROGRESS NOTES
[facility] INFECTIOUS DISEASE TELEMEDICINE PROGRESS NOTE    Timi Banner, [facility]. Patient Status:  Inpatient    1970 MRN [id number]   [facility] 5NW-A Attending Chery Gallegos, DO   Hosp Day # 10 PCP None Pcp     S/p Remdesivir  Dexamethasone  Sp 08/24/21  0734 08/25/21  0729   RBC 4.86 4.66 4.84   HGB 14.5 14.2 14.5   HCT 44.5 42.2 44.6   MCV 91.6 90.6 92.1   MCH 29.8 30.5 30.0   MCHC 32.6 33.6 32.5   RDW 13.1 12.8 12.7   NEPRELIM 9.75* 11.46* 8.23*   WBC 11.3* 12.8* 9.6   .0* 468.0* 446.0 Microbiology    Reviewed in EMR,   No results found for this visit on 08/14/21. Radiology: XR CHEST AP PORTABLE  (CPT=71045)    Result Date: 8/14/2021  PROCEDURE:  XR CHEST AP PORTABLE  (CPT=71045)  TECHNIQUE:  AP chest radiograph was obtained.

## 2021-08-25 NOTE — PROGRESS NOTES
Critical Care Progress Note        NAME: Nesha Grande - ROOM: [address] [place] [address] - MRN: [id number] - Age: 49year old - : 1970  Date of Admission: 2021  9:27 PM  Admission Diagnosis: Hypoxia [R09.02]  Pneumonia due to COVID-19 virus [U07.1, J12.82] atraumatic, no cyanosis or edema      Labs reviewed as noted below        ASSESSMENT/PLAN:  1.  Acute resp failure: secondary to covid-19 infection and PE  - O2 as needed; on vapotherm- discussed w/ nursing to change pt to HFNC  - recommend that pt self pro

## 2021-08-26 LAB
ALBUMIN SERPL-MCNC: 3.1 G/DL (ref 3.4–5)
ALBUMIN/GLOB SERPL: 0.9 {RATIO} (ref 1–2)
ALP LIVER SERPL-CCNC: 42 U/L
ALT SERPL-CCNC: 69 U/L
ANION GAP SERPL CALC-SCNC: 3 MMOL/L (ref 0–18)
AST SERPL-CCNC: 28 U/L (ref 15–37)
BILIRUB SERPL-MCNC: 0.6 MG/DL (ref 0.1–2)
BUN BLD-MCNC: 21 MG/DL (ref 7–18)
CALCIUM BLD-MCNC: 9 MG/DL (ref 8.5–10.1)
CHLORIDE SERPL-SCNC: 105 MMOL/L (ref 98–112)
CO2 SERPL-SCNC: 28 MMOL/L (ref 21–32)
CREAT BLD-MCNC: 0.8 MG/DL
CRP SERPL-MCNC: 0.66 MG/DL (ref ?–0.3)
D-DIMER: 0.74 UG/ML FEU (ref ?–0.5)
DEPRECATED HBV CORE AB SER IA-ACNC: 1721.8 NG/ML
GLOBULIN PLAS-MCNC: 3.6 G/DL (ref 2.8–4.4)
GLUCOSE BLD-MCNC: 105 MG/DL (ref 70–99)
LDH SERPL L TO P-CCNC: 265 U/L
M PROTEIN MFR SERPL ELPH: 6.7 G/DL (ref 6.4–8.2)
OSMOLALITY SERPL CALC.SUM OF ELEC: 285 MOSM/KG (ref 275–295)
POTASSIUM SERPL-SCNC: 4.5 MMOL/L (ref 3.5–5.1)
SODIUM SERPL-SCNC: 136 MMOL/L (ref 136–145)

## 2021-08-26 PROCEDURE — 99232 SBSQ HOSP IP/OBS MODERATE 35: CPT | Performed by: INTERNAL MEDICINE

## 2021-08-26 NOTE — PROGRESS NOTES
MARIE HOSPITALIST  Progress Note     Melven Freeze Patient Status:  Inpatient    1970 MRN [id number]   [facility] 5NW-A Attending Artis Rosa, [address] [facility] Day # 6 PCP None Pcp     Chief Complaint: SOB    S: No acute events overnigh COVID-19 Lab Results  COVID-19  Lab Results   Component Value Date    COVID19 Detected (A) 08/14/2021     Pro-Calcitonin  Recent Labs   Lab 08/21/21  0517   PCT 0.15     Cardiac  No results for input(s): TROP, PBNP in the last 168 hours.     Shady Asp patient and RN.      Malia Showers, DO

## 2021-08-26 NOTE — PROGRESS NOTES
[facility] INFECTIOUS DISEASE TELEMEDICINE PROGRESS NOTE    Marina Jaylennt [facility], [facility]. Patient Status:  Inpatient    1970 MRN [id number]   [facility] 5NW-A Attending Katerine Souza, DO   Hosp Day # 11 PCP None Pcp     S/p Remdesivir  Dexamethasone  Sp 14.5 14.2 14.5   HCT 44.5 42.2 44.6   MCV 91.6 90.6 92.1   MCH 29.8 30.5 30.0   MCHC 32.6 33.6 32.5   RDW 13.1 12.8 12.7   NEPRELIM 9.75* 11.46* 8.23*   WBC 11.3* 12.8* 9.6   .0* 468.0* 446.0   NEUT 88  --   --    LYMPH 5  --   --    MON 6  --   -- He is having some difficulty breathing. FINDINGS:  Low lung volumes. Magnified heart and normal pulmonary vascularity. No focal consolidation. Possible mild ground-glass opacity in left mid and lower lung. CONCLUSION:  Shallow inspiration.

## 2021-08-26 NOTE — COVID NURSING ASSESSMENT
COVID-19 Daily Discharge Readiness-Nursing    O2 Sat at Rest:   98% on 5L   Temperature max from last 24 hrs: Temp (24hrs), Av.9 °F (36.6 °C), Min:97.8 °F (36.6 °C), Max:98.1 °F (36.7 °C)    Inflammatory Markers:   Recent Labs   Lab 21  0734

## 2021-08-26 NOTE — PAYOR COMM NOTE
--------------  CONTINUED STAY REVIEW-----REQUESTING ADDITIONAL DAY 8/26      Payor: Jose Funk PPBAYRON  Subscriber #:  [id number]  Authorization Number: Eleazar Leslie    Admit date: 8/15/21  Admit time:  2:23 AM    Admitting Physician: Samantha Arreguin MD  Attending P 6.7      Estimated Creatinine Clearance: 121.3 mL/min (based on SCr of 0.8 mg/dL).          Recent Labs   Lab 08/24/21  0734   PTP 15.0*   INR 1.15*      COVID-19 Lab Results  COVID-19        Lab Results   Component Value Date     COVID19 Detected (A) 08/14 no  Estimated date of discharge: tbd  Discharge is dependent on: clinical progress  At this point Mr. Helm is expected to be discharge to: home     Plan of care discussed with patient and RN.      Lester Kocher,         [address]

## 2021-08-26 NOTE — PROGRESS NOTES
COVID-19 Daily Discharge Readiness-Nursing    O2 Sat at Rest:    95 % at 2.5L  O2 Sat with Exertion: SPO2% Ambulation on Oxygen: 81  % on Ambulation oxygen flow (liters per minute): 40  liters   Temperature max from last 24 hrs: Temp (24hrs), Av.2 °F (

## 2021-08-26 NOTE — PROGRESS NOTES
[address] [facility] Patient Status:  Inpatient    1970 MRN [id number]   [facility] 5NW-A Attending Andrew Coyle, DO   Hosp Day # 6 PCP None Pcp     Pulm / Critical Care Progress Note     S: overall pt is doing much bet 106 105   CO2 22.0 26.0 28.0   BUN 25* 25* 21*     Creatinine (mg/dL)   Date Value   08/26/2021 0.80   08/25/2021 0.79   08/24/2021 0.74   ]    Recent Labs   Lab 08/24/21  0734 08/25/21  0727 08/26/21  0745   ALT 29 38 69*   AST 11* 18 28     COVID-19 Lab

## 2021-08-27 LAB
ALBUMIN SERPL-MCNC: 3.2 G/DL (ref 3.4–5)
ALBUMIN/GLOB SERPL: 1 {RATIO} (ref 1–2)
ALP LIVER SERPL-CCNC: 35 U/L
ALT SERPL-CCNC: 88 U/L
ANION GAP SERPL CALC-SCNC: 4 MMOL/L (ref 0–18)
AST SERPL-CCNC: 33 U/L (ref 15–37)
BASOPHILS # BLD AUTO: 0.02 X10(3) UL (ref 0–0.2)
BASOPHILS NFR BLD AUTO: 0.2 %
BILIRUB SERPL-MCNC: 0.6 MG/DL (ref 0.1–2)
BUN BLD-MCNC: 17 MG/DL (ref 7–18)
CALCIUM BLD-MCNC: 8.8 MG/DL (ref 8.5–10.1)
CHLORIDE SERPL-SCNC: 104 MMOL/L (ref 98–112)
CO2 SERPL-SCNC: 29 MMOL/L (ref 21–32)
CREAT BLD-MCNC: 0.78 MG/DL
CRP SERPL-MCNC: 0.39 MG/DL (ref ?–0.3)
D-DIMER: 0.95 UG/ML FEU (ref ?–0.5)
DEPRECATED HBV CORE AB SER IA-ACNC: 1762.2 NG/ML
EOSINOPHIL # BLD AUTO: 0.04 X10(3) UL (ref 0–0.7)
EOSINOPHIL NFR BLD AUTO: 0.4 %
ERYTHROCYTE [DISTWIDTH] IN BLOOD BY AUTOMATED COUNT: 12.6 %
GLOBULIN PLAS-MCNC: 3.2 G/DL (ref 2.8–4.4)
GLUCOSE BLD-MCNC: 74 MG/DL (ref 70–99)
HCT VFR BLD AUTO: 45.3 %
HGB BLD-MCNC: 15 G/DL
IMM GRANULOCYTES # BLD AUTO: 0.16 X10(3) UL (ref 0–1)
IMM GRANULOCYTES NFR BLD: 1.5 %
LDH SERPL L TO P-CCNC: 248 U/L
LYMPHOCYTES # BLD AUTO: 0.78 X10(3) UL (ref 1–4)
LYMPHOCYTES NFR BLD AUTO: 7.4 %
M PROTEIN MFR SERPL ELPH: 6.4 G/DL (ref 6.4–8.2)
MCH RBC QN AUTO: 30.3 PG (ref 26–34)
MCHC RBC AUTO-ENTMCNC: 33.1 G/DL (ref 31–37)
MCV RBC AUTO: 91.5 FL
MONOCYTES # BLD AUTO: 0.66 X10(3) UL (ref 0.1–1)
MONOCYTES NFR BLD AUTO: 6.3 %
NEUTROPHILS # BLD AUTO: 8.86 X10 (3) UL (ref 1.5–7.7)
NEUTROPHILS # BLD AUTO: 8.86 X10(3) UL (ref 1.5–7.7)
NEUTROPHILS NFR BLD AUTO: 84.2 %
OSMOLALITY SERPL CALC.SUM OF ELEC: 284 MOSM/KG (ref 275–295)
PLATELET # BLD AUTO: 417 10(3)UL (ref 150–450)
POTASSIUM SERPL-SCNC: 4.3 MMOL/L (ref 3.5–5.1)
RBC # BLD AUTO: 4.95 X10(6)UL
SODIUM SERPL-SCNC: 137 MMOL/L (ref 136–145)
WBC # BLD AUTO: 10.5 X10(3) UL (ref 4–11)

## 2021-08-27 PROCEDURE — 99232 SBSQ HOSP IP/OBS MODERATE 35: CPT | Performed by: INTERNAL MEDICINE

## 2021-08-27 NOTE — PROGRESS NOTES
[facility] PULMONARY/CRITICAL CARE    S: Pt desaturates in the mornings, improved throughout the day. He is not having too much dyspnea presently.      Meds:  • acyclovir  400 mg Oral BID   • enoxaparin  1 mg/kg Subcutaneous 2 times per day   • guaiFENesin ER  600 08/24/21  0734 08/25/21  0727 08/25/21  0729 08/26/21  0745 08/27/21  0622   KEITH  --    < >  --  1,533.2*  --  1,721.8* 1,762.2*   LDH  --    < >  --  266*  --  265* 248*   DDIMER  --    < >   < >  --  0.67* 0.74* 0.95*   CRP  --    < >  --  1.07*  --  0.6

## 2021-08-27 NOTE — PROGRESS NOTES
MARIE HOSPITALIST  Progress Note     Marlen Herrera Patient Status:  Inpatient    1970 MRN [id number]   [facility] 5NW-A Attending Inés Mcallister, [address] [facility] Day # 12 PCP None Pcp     Chief Complaint: SOB    S: Patient feels well.  No f Estimated Creatinine Clearance: 124.4 mL/min (based on SCr of 0.78 mg/dL).     Recent Labs   Lab 08/24/21  0734   PTP 15.0*   INR 1.15*        COVID-19 Lab Results  COVID-19  Lab Results   Component Value Date    COVID19 Detected (A) 08/14/2021     Pro- discharge?: no  Estimated date of discharge: TBD  Discharge is dependent on: clinical progress  At this point Mr. Helm is expected to be discharge to: Home    Plan of care discussed with patient and RN.      John Ryan, DO

## 2021-08-27 NOTE — PROGRESS NOTES
COVID-19 Daily Discharge Readiness-Nursing    O2 Sat at Rest:  SPO2% on Room Air at Rest: 94  %   O2 Sat with Exertion: SPO2% Ambulation on Oxygen: 81  % on Ambulation oxygen flow (liters per minute): 40  liters   Temperature max from last 24 hrs: Temp (24

## 2021-08-27 NOTE — COVID NURSING ASSESSMENT
COVID-19 Daily Discharge Readiness-Nursing    O2 Sat at Rest:   97% on 2L  Temperature max from last 24 hrs: Temp (24hrs), Av.3 °F (36.8 °C), Min:98.1 °F (36.7 °C), Max:98.7 °F (37.1 °C)    Inflammatory Markers:   Recent Labs   Lab 21  0734

## 2021-08-27 NOTE — PROGRESS NOTES
08/27/21 1200   Mobility   O2 walk?  Yes   SPO2% on Room Air at Rest 94   SPO2% Ambulation on Room Air 91

## 2021-08-27 NOTE — PAYOR COMM NOTE
--------------  CONTINUED STAY REVIEW----REQUESTING ADDITIONAL DAY 8/27      Payor: Tushar Bhakta PPO  Subscriber #:  [id number]  Authorization Number: Juanita Naik    Admit date: 8/15/21  Admit time:  2:23 AM    Admitting Physician: Roland Huynh MD  Attending Ph 4.3    105 104   CO2 26.0 28.0 29.0   ALKPHO 35* 42* 35*   AST 18 28 33   ALT 38 69* 88*   BILT 0.6 0.6 0.6   TP 6.4 6.7 6.4      Estimated Creatinine Clearance: 124.4 mL/min (based on SCr of 0.78 mg/dL).          Recent Labs   Lab 08/24/21  0734   PT on echo     Quality:  · DVT Prophylaxis: lovenox full dose  · CODE status: full  · Damon: no  · Central line: no     Will the patient be referred to TCC on discharge?: no  Estimated date of discharge: TBD  Discharge is dependent on: clinical progress  At t

## 2021-08-27 NOTE — PROGRESS NOTES
[facility] INFECTIOUS DISEASE TELEMEDICINE PROGRESS NOTE    [facility], [facility]. Patient Status:  Inpatient    1970 MRN [id number]   [facility] 5NW-A Attending Inés Mcallister, DO   Hosp Day # 12 PCP None Pcp     S/p Remdesivir  Dexamethasone  Sp 08/23/21  0645 08/24/21  0734 08/25/21  0729 08/27/21  0622   RBC 4.86   < > 4.66 4.84 4.95   HGB 14.5   < > 14.2 14.5 15.0   HCT 44.5   < > 42.2 44.6 45.3   MCV 91.6   < > 90.6 92.1 91.5   MCH 29.8   < > 30.5 30.0 30.3   MCHC 32.6   < > 33.6 32.5 33.1   R visit on 08/14/21. Radiology: XR CHEST AP PORTABLE  (CPT=71045)    Result Date: 8/14/2021  PROCEDURE:  XR CHEST AP PORTABLE  (CPT=71045)  TECHNIQUE:  AP chest radiograph was obtained. COMPARISON:  None.   INDICATIONS:  LIGHTHEADED, COVID, LOW PULSE OX

## 2021-08-28 VITALS
TEMPERATURE: 98 F | RESPIRATION RATE: 16 BRPM | OXYGEN SATURATION: 94 % | WEIGHT: 213.63 LBS | HEART RATE: 64 BPM | HEIGHT: 72 IN | BODY MASS INDEX: 28.93 KG/M2 | SYSTOLIC BLOOD PRESSURE: 113 MMHG | DIASTOLIC BLOOD PRESSURE: 61 MMHG

## 2021-08-28 LAB
ALBUMIN SERPL-MCNC: 3 G/DL (ref 3.4–5)
ALBUMIN/GLOB SERPL: 1.1 {RATIO} (ref 1–2)
ALP LIVER SERPL-CCNC: 31 U/L
ALT SERPL-CCNC: 133 U/L
ANION GAP SERPL CALC-SCNC: 5 MMOL/L (ref 0–18)
AST SERPL-CCNC: 50 U/L (ref 15–37)
BASOPHILS # BLD AUTO: 0.03 X10(3) UL (ref 0–0.2)
BASOPHILS NFR BLD AUTO: 0.4 %
BILIRUB SERPL-MCNC: 0.6 MG/DL (ref 0.1–2)
BUN BLD-MCNC: 21 MG/DL (ref 7–18)
CALCIUM BLD-MCNC: 8.4 MG/DL (ref 8.5–10.1)
CHLORIDE SERPL-SCNC: 106 MMOL/L (ref 98–112)
CO2 SERPL-SCNC: 28 MMOL/L (ref 21–32)
CREAT BLD-MCNC: 0.88 MG/DL
CRP SERPL-MCNC: <0.29 MG/DL (ref ?–0.3)
D-DIMER: 1.04 UG/ML FEU (ref ?–0.5)
DEPRECATED HBV CORE AB SER IA-ACNC: 1647.3 NG/ML
EOSINOPHIL # BLD AUTO: 0.14 X10(3) UL (ref 0–0.7)
EOSINOPHIL NFR BLD AUTO: 2 %
ERYTHROCYTE [DISTWIDTH] IN BLOOD BY AUTOMATED COUNT: 13.2 %
GLOBULIN PLAS-MCNC: 2.8 G/DL (ref 2.8–4.4)
GLUCOSE BLD-MCNC: 76 MG/DL (ref 70–99)
HCT VFR BLD AUTO: 42.3 %
HGB BLD-MCNC: 14.3 G/DL
IMM GRANULOCYTES # BLD AUTO: 0.14 X10(3) UL (ref 0–1)
IMM GRANULOCYTES NFR BLD: 2 %
LDH SERPL L TO P-CCNC: 247 U/L
LYMPHOCYTES # BLD AUTO: 1.07 X10(3) UL (ref 1–4)
LYMPHOCYTES NFR BLD AUTO: 14.9 %
M PROTEIN MFR SERPL ELPH: 5.8 G/DL (ref 6.4–8.2)
MCH RBC QN AUTO: 30.6 PG (ref 26–34)
MCHC RBC AUTO-ENTMCNC: 33.8 G/DL (ref 31–37)
MCV RBC AUTO: 90.6 FL
MONOCYTES # BLD AUTO: 0.53 X10(3) UL (ref 0.1–1)
MONOCYTES NFR BLD AUTO: 7.4 %
NEUTROPHILS # BLD AUTO: 5.25 X10 (3) UL (ref 1.5–7.7)
NEUTROPHILS # BLD AUTO: 5.25 X10(3) UL (ref 1.5–7.7)
NEUTROPHILS NFR BLD AUTO: 73.3 %
OSMOLALITY SERPL CALC.SUM OF ELEC: 290 MOSM/KG (ref 275–295)
PLATELET # BLD AUTO: 359 10(3)UL (ref 150–450)
POTASSIUM SERPL-SCNC: 4 MMOL/L (ref 3.5–5.1)
RBC # BLD AUTO: 4.67 X10(6)UL
SODIUM SERPL-SCNC: 139 MMOL/L (ref 136–145)
WBC # BLD AUTO: 7.2 X10(3) UL (ref 4–11)

## 2021-08-28 PROCEDURE — 99239 HOSP IP/OBS DSCHRG MGMT >30: CPT | Performed by: INTERNAL MEDICINE

## 2021-08-28 NOTE — CM/SW NOTE
RN states pt will discharge in Eliquis. SW provided pt with eliquis discount card. No further needs anticipated.

## 2021-08-28 NOTE — PLAN OF CARE
COVID-19 Daily Discharge Readiness-Nursing    O2 Sat at Rest:  SPO2% on Room Air at Rest: 94  %   O2 Sat with Exertion: SPO2% Ambulation on Oxygen: 81  % on Ambulation oxygen flow (liters per minute): 40  liters   Temperature max from last 24 hrs: Temp (24 interventions  Outcome: Progressing     Problem: RESPIRATORY - ADULT  Goal: Achieves optimal ventilation and oxygenation  Description: INTERVENTIONS:  - Assess for changes in respiratory status  - Assess for changes in mentation and behavior  - Position to

## 2021-08-28 NOTE — PROGRESS NOTES
NURSING DISCHARGE NOTE    Discharged Home via Wheelchair. Accompanied by Support staff  Belongings Taken by patient/family. Patient stable upon discharge. IV removed. Discharge instructions and information given to the patient.  No further questio

## 2021-08-28 NOTE — PLAN OF CARE
Problem: Patient/Family Goals  Goal: Patient/Family Long Term Goal  Description: Patient's Long Term Goal: go home with no oxygen    Interventions:  - Continue to wean O2  -O2 walk  -Rem/dec  - See additional Care Plan goals for specific interventions  O

## 2021-08-28 NOTE — PROGRESS NOTES
[facility] INFECTIOUS DISEASE TELEMEDICINE PROGRESS NOTE    Eliane Leonbaltazar [facility], [facility]. Patient Status:  Inpatient    1970 MRN [id number]   [facility] 5NW-A Attending Jewell Rawls DO   Hosp Day # 15 PCP None Pcp     S/p Remdesivir  Dexamethasone  Sp 08/27/21  0622 08/28/21  0636   RBC 4.86   < > 4.84 4.95 4.67   HGB 14.5   < > 14.5 15.0 14.3   HCT 44.5   < > 44.6 45.3 42.3   MCV 91.6   < > 92.1 91.5 90.6   MCH 29.8   < > 30.0 30.3 30.6   MCHC 32.6   < > 32.5 33.1 33.8   RDW 13.1   < > 12.7 12.6 13.2 TECHNIQUE:  AP chest radiograph was obtained. COMPARISON:  None. INDICATIONS:  LIGHTHEADED, COVID, LOW PULSE OX  PATIENT STATED HISTORY: (As transcribed by Technologist)  Patient was recently diagnosed with COVID on Tuesday.  He is having some difficulty

## 2021-08-28 NOTE — DISCHARGE SUMMARY
[facility] HOSPITALIST  DISCHARGE SUMMARY     [facility] Patient Status:  Inpatient    1970 MRN [id number]   [facility] 5NW-A Attending No att. providers found   [address] Day # 13 PCP None Pcp     Date of Admission: 2021  Date of Dis Risk of readmission after discharge from the hospital.    Procedures during hospitalization:   • None    Incidental or significant findings and recommendations (brief descriptions):  • Please see brief synopsis above    Lab/Test results pending at [facility]

## 2021-08-28 NOTE — PROGRESS NOTES
[facility] PULMONARY/CRITICAL CARE    S: Pt is feeling better. He wants to go home.     Meds:  • acyclovir  400 mg Oral BID   • enoxaparin  1 mg/kg Subcutaneous 2 times per day   • guaiFENesin ER  600 mg Oral BID   • zinc sulfate  220 mg Oral BID   • cholecalcifer interval not displayed.      Recent Labs   Lab 08/22/21  0635 08/24/21  0734   INR  --  1.15*   .0*  --        Recent Labs   Lab 08/26/21  0745 08/27/21  0622 08/28/21  0636   KEITH 1,721.8* 1,762.2* 1,647.3*   * 248* 247*   DDIMER 0.74* 0.95* 1

## 2021-08-28 NOTE — PROGRESS NOTES
[facility] HOSPITALIST  Progress Note     Melissa Mccarthy Patient Status:  Inpatient    1970 MRN [id number]   [facility] 5NW-A Attending Jalil Baugh, [address] [facility] Day # 15 PCP None Pcp     Chief Complaint: SOB    S: Patient states he is doi PTP 15.0*   INR 1.15*        COVID-19 Lab Results  COVID-19  Lab Results   Component Value Date    COVID19 Detected (A) 08/14/2021     Pro-Calcitonin  No results for input(s): PCT in the last 168 hours.   Cardiac  No results for input(s): TROP, PBNP in th

## 2021-08-30 ENCOUNTER — PATIENT OUTREACH (OUTPATIENT)
Dept: CASE MANAGEMENT | Age: 51
End: 2021-08-30

## 2021-08-30 DIAGNOSIS — U07.1 PNEUMONIA DUE TO COVID-19 VIRUS: ICD-10-CM

## 2021-08-30 DIAGNOSIS — Z02.9 ENCOUNTERS FOR UNSPECIFIED ADMINISTRATIVE PURPOSE: ICD-10-CM

## 2021-08-30 DIAGNOSIS — J12.82 PNEUMONIA DUE TO COVID-19 VIRUS: ICD-10-CM

## 2021-08-30 NOTE — PROGRESS NOTES
Initial Post Discharge Follow Up   Discharge Date: 8/28/21  Contact Date: 8/30/2021    Consent Verification:  Assessment Completed With: Patient  HIPAA Verified?   Yes    Discharge Dx:     COVID-19 pneumonia, improved  Acute bilateral pulmonary embolism you able to perform normal daily activities of living as you have prior to your hospital stay (dressing, bathing, ambulating to the bathroom, etc)? yes- Pt does take his time and rest as needed.    • (NCM) Was patient given a different diet per AVS? no   • discuss: fever, chills, shaking, chest pain, productive cough, difficulty breathing)    Comments:          Needs post D/C:   Now that you are home, are there any needs or concerns you need addressed before your next visit with your PCP?  (RICARDO, meds, diseas return to the ED if experiencing chest pain, trouble breathing, confusion, or a high fever that does not decrease with the use of tylenol.      CCM referral placed:  Not Applicable      [x]  Discharge Summary, Discharge medications reviewed/discussed/and re

## 2021-09-01 ENCOUNTER — TELEMEDICINE (OUTPATIENT)
Dept: INTERNAL MEDICINE CLINIC | Facility: CLINIC | Age: 51
End: 2021-09-01

## 2021-09-01 DIAGNOSIS — F41.9 ANXIETY: ICD-10-CM

## 2021-09-01 DIAGNOSIS — I82.412 ACUTE DEEP VEIN THROMBOSIS (DVT) OF FEMORAL VEIN OF LEFT LOWER EXTREMITY (HCC): ICD-10-CM

## 2021-09-01 DIAGNOSIS — I26.99 ACUTE PULMONARY EMBOLISM WITHOUT ACUTE COR PULMONALE, UNSPECIFIED PULMONARY EMBOLISM TYPE (HCC): ICD-10-CM

## 2021-09-01 DIAGNOSIS — U07.1 PNEUMONIA DUE TO COVID-19 VIRUS: Primary | ICD-10-CM

## 2021-09-01 DIAGNOSIS — R04.0 EPISTAXIS: ICD-10-CM

## 2021-09-01 DIAGNOSIS — J12.82 PNEUMONIA DUE TO COVID-19 VIRUS: Primary | ICD-10-CM

## 2021-09-01 DIAGNOSIS — G47.09 OTHER INSOMNIA: ICD-10-CM

## 2021-09-01 DIAGNOSIS — I10 PRIMARY HYPERTENSION: ICD-10-CM

## 2021-09-01 PROCEDURE — 99495 TRANSJ CARE MGMT MOD F2F 14D: CPT | Performed by: NURSE PRACTITIONER

## 2021-09-01 RX ORDER — CHLORHEXIDINE GLUCONATE 4 %
1 LIQUID (ML) TOPICAL NIGHTLY PRN
COMMUNITY
End: 2021-12-09 | Stop reason: ALTCHOICE

## 2021-09-01 RX ORDER — MULTIVIT WITH MINERALS/LUTEIN
1000 TABLET ORAL DAILY
COMMUNITY

## 2021-09-01 RX ORDER — ZINC SULFATE 50(220)MG
220 CAPSULE ORAL DAILY
COMMUNITY

## 2021-09-01 RX ORDER — MELATONIN
1000 DAILY
COMMUNITY

## 2021-09-01 NOTE — PROGRESS NOTES
[facility] PHARMACIST MEDICATION RECONCILIATION        Nesha Grande MRN [id number]    1970 PCP None Pcp       Comments: Medication history completed by the [address] [place] [address] Pharmacist with the patient using two-way, real ti discharge. Reports he checks his blood pressure every day and it has been normal, so he has not restarted the medication yet. Reports his blood pressure today was 130/73. Denies any lightheadedness or dizziness.     Reviewed all medications in detail wit

## 2021-09-03 PROBLEM — I82.412 ACUTE DEEP VEIN THROMBOSIS (DVT) OF FEMORAL VEIN OF LEFT LOWER EXTREMITY (HCC): Status: ACTIVE | Noted: 2021-09-03

## 2021-09-03 PROBLEM — G47.09 OTHER INSOMNIA: Status: ACTIVE | Noted: 2021-09-03

## 2021-09-03 PROBLEM — R04.0 EPISTAXIS: Status: ACTIVE | Noted: 2021-09-03

## 2021-09-03 PROBLEM — F41.9 ANXIETY: Status: ACTIVE | Noted: 2021-09-03

## 2021-09-03 NOTE — PROGRESS NOTES
Video Visit  [address]      Please note that the following visit was completed using two-way, real-time interactive audio and video communication.   This has been done in good em to provide continuity of care in the best at times during the exam. He reports he has not slept for more than 2 hours since discharge. He did try Melatonin 12mg at bedtime and reports that's when he was able to sleep for 2 hours.  Discussed he appears anxious and per his report this is not his norm Disp: , Rfl:   Melatonin 12 MG Oral Tab, Take 1 tablet by mouth nightly as needed. , Disp: , Rfl:   apixaban 5 MG Oral Tab, Take 1 tablet (5 mg total) by mouth 2 (two) times daily. , Disp: 60 tablet, Rfl: 1  Losartan Potassium-HCTZ 50-12.5 MG Oral Tab, Take on 8/20/2021 at 6:57 PM     Finalized by (CST): Dorothea Farah MD on 8/20/2021 at 7:10 PM         Lab Results   Component Value Date/Time    WBC 7.2 08/28/2021 06:36 AM    HGB 14.3 08/28/2021 06:36 AM    HCT 42.3 08/28/2021 06:36 AM    .0 08/28/2021 for cough suppression  · Continue Eliquis BID   · Monitor for s/s of bleeding  · Establish with PCP Dr. Rosey Gay on 9/9 at 12:40pm  · Follow-up with pulmonology Dr. Rafa Govea on 9/9 at 2:40pm  · All hospital records, labs, and radiology reviewed    2.  Acute pu hours as needed. Melatonin 12 MG Oral Tab          Sig: Take 1 tablet by mouth nightly as needed. Medications & Refills for this Visit:  zinc sulfate 220 (50 Zn) MG Oral Cap, Take 220 mg by mouth daily. , Disp: , Rfl:   Vitamin D3, Cholecalcif reconciled with the patient's current medication list and reviewed by me. See medication list for additions of new medication, and changes to current doses of medications and discontinued medications.     I certify that the following are true:  Communicati

## 2021-09-08 ENCOUNTER — PATIENT MESSAGE (OUTPATIENT)
Dept: FAMILY MEDICINE CLINIC | Facility: CLINIC | Age: 51
End: 2021-09-08

## 2021-09-08 NOTE — TELEPHONE ENCOUNTER
Called Pt to confirm appointment. Per Pt he already establish care with diff PCP and appt was made when discharged from [place] [id number] for this appt 09/09/2021 with Dr Kathy Styles to be canceled.

## 2021-10-28 ENCOUNTER — OFFICE VISIT (OUTPATIENT)
Dept: FAMILY MEDICINE CLINIC | Facility: CLINIC | Age: 51
End: 2021-10-28
Payer: COMMERCIAL

## 2021-10-28 VITALS
HEIGHT: 71 IN | OXYGEN SATURATION: 96 % | SYSTOLIC BLOOD PRESSURE: 110 MMHG | HEART RATE: 60 BPM | WEIGHT: 217 LBS | BODY MASS INDEX: 30.38 KG/M2 | DIASTOLIC BLOOD PRESSURE: 74 MMHG | RESPIRATION RATE: 16 BRPM

## 2021-10-28 DIAGNOSIS — Z86.16 HISTORY OF COVID-19: Primary | ICD-10-CM

## 2021-10-28 DIAGNOSIS — Z00.00 LABORATORY EXAMINATION ORDERED AS PART OF A ROUTINE GENERAL MEDICAL EXAMINATION: ICD-10-CM

## 2021-10-28 DIAGNOSIS — F41.9 ANXIETY: ICD-10-CM

## 2021-10-28 DIAGNOSIS — I26.99 ACUTE PULMONARY EMBOLISM WITHOUT ACUTE COR PULMONALE, UNSPECIFIED PULMONARY EMBOLISM TYPE (HCC): ICD-10-CM

## 2021-10-28 DIAGNOSIS — H93.11 TINNITUS OF RIGHT EAR: ICD-10-CM

## 2021-10-28 DIAGNOSIS — I10 ESSENTIAL HYPERTENSION: ICD-10-CM

## 2021-10-28 DIAGNOSIS — Z12.11 COLON CANCER SCREENING: ICD-10-CM

## 2021-10-28 DIAGNOSIS — I82.412 ACUTE DEEP VEIN THROMBOSIS (DVT) OF FEMORAL VEIN OF LEFT LOWER EXTREMITY (HCC): ICD-10-CM

## 2021-10-28 DIAGNOSIS — R09.82 POST-NASAL DRIP: ICD-10-CM

## 2021-10-28 PROBLEM — R09.02 HYPOXIA: Status: RESOLVED | Noted: 2021-08-15 | Resolved: 2021-10-28

## 2021-10-28 PROBLEM — R04.0 EPISTAXIS: Status: RESOLVED | Noted: 2021-09-03 | Resolved: 2021-10-28

## 2021-10-28 PROCEDURE — 99204 OFFICE O/P NEW MOD 45 MIN: CPT | Performed by: FAMILY MEDICINE

## 2021-10-28 PROCEDURE — 3008F BODY MASS INDEX DOCD: CPT | Performed by: FAMILY MEDICINE

## 2021-10-28 PROCEDURE — 3078F DIAST BP <80 MM HG: CPT | Performed by: FAMILY MEDICINE

## 2021-10-28 PROCEDURE — 3074F SYST BP LT 130 MM HG: CPT | Performed by: FAMILY MEDICINE

## 2021-10-28 RX ORDER — APIXABAN 5 MG/1
5 TABLET, FILM COATED ORAL 2 TIMES DAILY
COMMUNITY
Start: 2021-09-30 | End: 2021-12-09 | Stop reason: ALTCHOICE

## 2021-10-28 RX ORDER — LOSARTAN POTASSIUM AND HYDROCHLOROTHIAZIDE 12.5; 5 MG/1; MG/1
1 TABLET ORAL DAILY
COMMUNITY
End: 2021-12-09

## 2021-10-28 NOTE — PROGRESS NOTES
[facility] Family Medicine Office Note  Chief Complaint:   Patient presents with:  Establish Care: Np Covid follow up,       HPI:   This is a 49year old male coming in to establish care.      Patient was admitted from August 14 to August 28 for C sulfate 220 (50 Zn) MG Oral Cap Take 220 mg by mouth daily. • Vitamin D3, Cholecalciferol, 25 MCG (1000 UT) Oral Tab Take 1,000 Units by mouth daily. • Ascorbic Acid (VITAMIN C) 1000 MG Oral Tab Take 1,000 mg by mouth daily.      • Melatonin 12 MG O guarding or rebound. Musculoskeletal:         General: No tenderness. Normal range of motion. Cervical back: Normal range of motion and neck supple. Lymphadenopathy:      Cervical: No cervical adenopathy. Skin:     General: Skin is warm and dry. Never done    Patient/Caregiver Education: Patient/Caregiver Education: There are no barriers to learning. Medical education done. Outcome: Patient verbalizes understanding.  Patient is notified to call with any questions, complications, allergies, or wor

## 2021-11-16 ENCOUNTER — LAB ENCOUNTER (OUTPATIENT)
Dept: LAB | Age: 51
End: 2021-11-16
Attending: FAMILY MEDICINE
Payer: COMMERCIAL

## 2021-11-16 DIAGNOSIS — Z00.00 LABORATORY EXAMINATION ORDERED AS PART OF A ROUTINE GENERAL MEDICAL EXAMINATION: ICD-10-CM

## 2021-11-16 DIAGNOSIS — Z86.16 HISTORY OF COVID-19: ICD-10-CM

## 2021-11-16 PROCEDURE — 84153 ASSAY OF PSA TOTAL: CPT | Performed by: FAMILY MEDICINE

## 2021-11-16 PROCEDURE — 80061 LIPID PANEL: CPT | Performed by: FAMILY MEDICINE

## 2021-11-16 PROCEDURE — 80050 GENERAL HEALTH PANEL: CPT | Performed by: FAMILY MEDICINE

## 2021-11-16 PROCEDURE — 86769 SARS-COV-2 COVID-19 ANTIBODY: CPT | Performed by: FAMILY MEDICINE

## 2021-11-17 ENCOUNTER — TELEPHONE (OUTPATIENT)
Dept: FAMILY MEDICINE CLINIC | Facility: CLINIC | Age: 51
End: 2021-11-17

## 2021-11-17 NOTE — TELEPHONE ENCOUNTER
----- Message from Jeremy Pacheco MD sent at 11/17/2021 10:07 AM CST -----  Annual labs reviewed. Liver function enzymes now back to normal. LDL elevated - needs to follow low fat heart healthy diet, regular exercise.  Otherwise annual labs look good

## 2021-12-09 ENCOUNTER — OFFICE VISIT (OUTPATIENT)
Dept: FAMILY MEDICINE CLINIC | Facility: CLINIC | Age: 51
End: 2021-12-09
Payer: COMMERCIAL

## 2021-12-09 VITALS
DIASTOLIC BLOOD PRESSURE: 84 MMHG | RESPIRATION RATE: 18 BRPM | HEIGHT: 71 IN | WEIGHT: 216 LBS | OXYGEN SATURATION: 97 % | HEART RATE: 77 BPM | BODY MASS INDEX: 30.24 KG/M2 | SYSTOLIC BLOOD PRESSURE: 136 MMHG

## 2021-12-09 DIAGNOSIS — F41.9 ANXIETY: ICD-10-CM

## 2021-12-09 DIAGNOSIS — L30.9 DERMATITIS: ICD-10-CM

## 2021-12-09 DIAGNOSIS — I10 ESSENTIAL HYPERTENSION: ICD-10-CM

## 2021-12-09 DIAGNOSIS — Z12.11 SCREENING FOR COLON CANCER: ICD-10-CM

## 2021-12-09 DIAGNOSIS — Z00.00 WELLNESS EXAMINATION: Primary | ICD-10-CM

## 2021-12-09 PROCEDURE — 3075F SYST BP GE 130 - 139MM HG: CPT | Performed by: FAMILY MEDICINE

## 2021-12-09 PROCEDURE — 99396 PREV VISIT EST AGE 40-64: CPT | Performed by: FAMILY MEDICINE

## 2021-12-09 PROCEDURE — 3008F BODY MASS INDEX DOCD: CPT | Performed by: FAMILY MEDICINE

## 2021-12-09 PROCEDURE — 99214 OFFICE O/P EST MOD 30 MIN: CPT | Performed by: FAMILY MEDICINE

## 2021-12-09 PROCEDURE — 3079F DIAST BP 80-89 MM HG: CPT | Performed by: FAMILY MEDICINE

## 2021-12-09 RX ORDER — LOSARTAN POTASSIUM AND HYDROCHLOROTHIAZIDE 12.5; 5 MG/1; MG/1
1 TABLET ORAL DAILY
Qty: 90 TABLET | Refills: 1 | Status: SHIPPED | OUTPATIENT
Start: 2021-12-09

## 2021-12-09 RX ORDER — DESONIDE 0.5 MG/ML
1 LOTION TOPICAL 2 TIMES DAILY
Qty: 118 ML | Refills: 0 | Status: SHIPPED | OUTPATIENT
Start: 2021-12-09 | End: 2021-12-23

## 2021-12-09 RX ORDER — CLONAZEPAM 0.5 MG/1
0.5 TABLET ORAL DAILY PRN
Qty: 30 TABLET | Refills: 1 | Status: SHIPPED | OUTPATIENT
Start: 2021-12-09

## 2021-12-09 NOTE — PROGRESS NOTES
HPI:   Nesha Grande is a 49year old male who presents for an Annual Health Visit. R axillary rash ongoing for the past 1-2 weeks. No tenderness, warmth, discharge. No L axillary symptoms.       Allergies:   No Known Allergies    CURRENT MEDICATIO negative  Neurological: negative  Psych: negative  Endocrine: negative  Allergic/Immune: negative    EXAM:   /84   Pulse 77   Resp 18   Ht 5' 11\" (1.803 m)   Wt 216 lb (98 kg)   SpO2 97%   BMI 30.13 kg/m²    Wt Readings from Last 6 Encounters:  12/0 lean meats. Limit processed and junk foods. Aim for at least 150 minutes of moderate intensity exercise weekly. Make sure you are staying adequately hydrated. Aim to get 7-9 hours of sleep nightly.      Screening for colon cancer  -     SURGERY - INTERNAL 120/80 mm Hg  If your blood pressure reading is higher than normal, follow the advice of your healthcare provider   Colorectal cancer All men at average risk in this age group Multiple tests are available and are used at different times.  Possible tests inc smoking history  a Eligibility criteria and age limit (possibly up to age [de-identified]) may vary across major organizations    Yearly lung cancer screening with a low-dose CT scan (LDCT); talk with your healthcare provider   Obesity All men in this age group At year increased risk for infection 1 or 2 doses depending on your case. Then a booster every 5 years if you are still at risk. Talk with your healthcare provider.    Meningococcal B (MenB) Men at increased risk for infection 2 or 3 doses, depending on the vaccine and other health conditions such as diabetes. Talk with your healthcare provider about your risk .    Use of tobacco and the health effects it can cause All men in this age group Every exam   Ana last reviewed this educational content on 5/1/2019  © 20

## 2021-12-15 ENCOUNTER — MED REC SCAN ONLY (OUTPATIENT)
Dept: FAMILY MEDICINE CLINIC | Facility: CLINIC | Age: 51
End: 2021-12-15

## 2022-03-09 ENCOUNTER — OFFICE VISIT (OUTPATIENT)
Dept: FAMILY MEDICINE CLINIC | Facility: CLINIC | Age: 52
End: 2022-03-09
Payer: COMMERCIAL

## 2022-03-09 VITALS
RESPIRATION RATE: 16 BRPM | WEIGHT: 225 LBS | BODY MASS INDEX: 31.5 KG/M2 | SYSTOLIC BLOOD PRESSURE: 122 MMHG | DIASTOLIC BLOOD PRESSURE: 88 MMHG | OXYGEN SATURATION: 98 % | HEART RATE: 69 BPM | HEIGHT: 71 IN

## 2022-03-09 DIAGNOSIS — I70.0 AORTIC ATHEROSCLEROSIS (HCC): Primary | ICD-10-CM

## 2022-03-09 DIAGNOSIS — G89.29 CHRONIC RIGHT SHOULDER PAIN: ICD-10-CM

## 2022-03-09 DIAGNOSIS — F41.9 ANXIETY: ICD-10-CM

## 2022-03-09 DIAGNOSIS — M25.511 CHRONIC RIGHT SHOULDER PAIN: ICD-10-CM

## 2022-03-09 PROCEDURE — 3074F SYST BP LT 130 MM HG: CPT | Performed by: FAMILY MEDICINE

## 2022-03-09 PROCEDURE — 99214 OFFICE O/P EST MOD 30 MIN: CPT | Performed by: FAMILY MEDICINE

## 2022-03-09 PROCEDURE — 3079F DIAST BP 80-89 MM HG: CPT | Performed by: FAMILY MEDICINE

## 2022-03-09 PROCEDURE — 3008F BODY MASS INDEX DOCD: CPT | Performed by: FAMILY MEDICINE

## 2022-03-09 RX ORDER — CLONAZEPAM 0.5 MG/1
0.5 TABLET ORAL DAILY PRN
Qty: 30 TABLET | Refills: 5 | Status: SHIPPED | OUTPATIENT
Start: 2022-03-09

## 2022-03-09 RX ORDER — NAPROXEN 500 MG/1
500 TABLET ORAL 2 TIMES DAILY WITH MEALS
Qty: 60 TABLET | Refills: 0 | Status: SHIPPED | OUTPATIENT
Start: 2022-03-09

## 2022-03-09 NOTE — PATIENT INSTRUCTIONS
1. Complete heart scan, stress test, carotid ultrasound. 2. Complete shoulder XR. Take naproxen as needed for pain. 3. Continue clonazepam as needed.

## 2022-05-05 ENCOUNTER — ORDER TRANSCRIPTION (OUTPATIENT)
Dept: ADMINISTRATIVE | Facility: HOSPITAL | Age: 52
End: 2022-05-05

## 2022-05-10 ENCOUNTER — LAB ENCOUNTER (OUTPATIENT)
Dept: LAB | Age: 52
End: 2022-05-10
Attending: FAMILY MEDICINE
Payer: COMMERCIAL

## 2022-05-10 DIAGNOSIS — I70.0 AORTIC ATHEROSCLEROSIS (HCC): ICD-10-CM

## 2022-05-10 LAB — SARS-COV-2 RNA RESP QL NAA+PROBE: NOT DETECTED

## 2022-05-13 ENCOUNTER — HOSPITAL ENCOUNTER (OUTPATIENT)
Dept: CV DIAGNOSTICS | Age: 52
Discharge: HOME | End: 2022-05-13
Attending: FAMILY MEDICINE
Payer: COMMERCIAL

## 2022-05-13 DIAGNOSIS — I70.0 AORTIC ATHEROSCLEROSIS (HCC): ICD-10-CM

## 2022-05-13 PROCEDURE — 93018 CV STRESS TEST I&R ONLY: CPT | Performed by: FAMILY MEDICINE

## 2022-05-13 PROCEDURE — 93017 CV STRESS TEST TRACING ONLY: CPT | Performed by: FAMILY MEDICINE

## 2022-05-17 ENCOUNTER — TELEPHONE (OUTPATIENT)
Dept: FAMILY MEDICINE CLINIC | Facility: CLINIC | Age: 52
End: 2022-05-17

## 2022-05-17 DIAGNOSIS — R94.31 ABNORMAL EKG: Primary | ICD-10-CM

## 2022-05-17 NOTE — TELEPHONE ENCOUNTER
Spoke to pt he has an appt on 6/3/22 with cardiology but is just asking could Dr. Chaim Gonzales be more specific with the results? He said is wife is just concerned as pt went thru a lot this past year with covid.     thanks

## 2022-05-17 NOTE — TELEPHONE ENCOUNTER
Spoke to patient and informed him of the abnormal EKG. Also gave him Cardiology phone number, ER precautions also shared with patient. States he will call for an  appointment.

## 2022-05-17 NOTE — TELEPHONE ENCOUNTER
----- Message from Marlyce Bamberger, MD sent at 5/17/2022  8:46 AM CDT -----  Abnormal stress test. Needs to see cardiology. Please review ER precautions with patient.

## 2022-05-18 ENCOUNTER — HOSPITAL ENCOUNTER (OUTPATIENT)
Dept: ULTRASOUND IMAGING | Age: 52
Discharge: HOME | End: 2022-05-18
Attending: FAMILY MEDICINE
Payer: COMMERCIAL

## 2022-05-18 ENCOUNTER — HOSPITAL ENCOUNTER (OUTPATIENT)
Dept: GENERAL RADIOLOGY | Age: 52
Discharge: HOME | End: 2022-05-18
Attending: FAMILY MEDICINE
Payer: COMMERCIAL

## 2022-05-18 ENCOUNTER — TELEPHONE (OUTPATIENT)
Dept: FAMILY MEDICINE CLINIC | Facility: CLINIC | Age: 52
End: 2022-05-18

## 2022-05-18 DIAGNOSIS — I70.0 AORTIC ATHEROSCLEROSIS (HCC): ICD-10-CM

## 2022-05-18 DIAGNOSIS — G89.29 CHRONIC RIGHT SHOULDER PAIN: ICD-10-CM

## 2022-05-18 DIAGNOSIS — G89.29 CHRONIC RIGHT SHOULDER PAIN: Primary | ICD-10-CM

## 2022-05-18 DIAGNOSIS — M25.511 CHRONIC RIGHT SHOULDER PAIN: Primary | ICD-10-CM

## 2022-05-18 DIAGNOSIS — M25.511 CHRONIC RIGHT SHOULDER PAIN: ICD-10-CM

## 2022-05-18 PROCEDURE — 93880 EXTRACRANIAL BILAT STUDY: CPT | Performed by: FAMILY MEDICINE

## 2022-05-18 PROCEDURE — 73030 X-RAY EXAM OF SHOULDER: CPT | Performed by: FAMILY MEDICINE

## 2022-05-18 NOTE — TELEPHONE ENCOUNTER
Called pt and was informed of xray shoulder and US carotid results. All questions answered and pt verbalized understanding. The [facility] in [address] [place] [address] Drive [facility], [facility]  [phone].

## 2022-05-18 NOTE — TELEPHONE ENCOUNTER
Called pt and was informed of below response from provider. All questions answered, pt states he does have appt with cardiologist and pt verbalized understanding.

## 2022-05-18 NOTE — TELEPHONE ENCOUNTER
There were EKG changes during the exercise stress test. It could represent myocardial ischemia (blood flow to certain areas of heart is reduce which could prevent heart muscle from receiving enough oxygen) so that's why we would want a cardiologist to evaluate you.

## 2022-05-18 NOTE — TELEPHONE ENCOUNTER
----- Message from Julian Ohara MD sent at 5/18/2022  4:44 PM CDT -----  Carotid doppler normal, no plaque identified.

## 2022-05-18 NOTE — TELEPHONE ENCOUNTER
----- Message from Lv Hannah MD sent at 5/18/2022  4:45 PM CDT -----  C/w mild OA ofr R AC joint. Would recommend to start PT, continue naproxen. F/u if no improvement.

## 2022-05-23 ENCOUNTER — HOSPITAL ENCOUNTER (OUTPATIENT)
Dept: CT IMAGING | Age: 52
Discharge: HOME | End: 2022-05-23
Attending: INTERNAL MEDICINE
Payer: COMMERCIAL

## 2022-05-23 DIAGNOSIS — U07.1 COVID-19: ICD-10-CM

## 2022-05-23 PROCEDURE — 71250 CT THORAX DX C-: CPT | Performed by: INTERNAL MEDICINE

## 2022-09-12 DIAGNOSIS — F41.9 ANXIETY: ICD-10-CM

## 2022-09-12 RX ORDER — CLONAZEPAM 0.5 MG/1
TABLET ORAL
Qty: 30 TABLET | Refills: 5 | Status: SHIPPED | OUTPATIENT
Start: 2022-09-12

## 2022-09-12 RX ORDER — CLONAZEPAM 0.5 MG/1
0.5 TABLET ORAL DAILY PRN
Qty: 30 TABLET | Refills: 5 | Status: CANCELLED | OUTPATIENT
Start: 2022-09-12

## 2022-09-21 ENCOUNTER — OFFICE VISIT (OUTPATIENT)
Dept: FAMILY MEDICINE CLINIC | Facility: CLINIC | Age: 52
End: 2022-09-21

## 2022-09-21 ENCOUNTER — ORDER TRANSCRIPTION (OUTPATIENT)
Dept: ADMINISTRATIVE | Facility: HOSPITAL | Age: 52
End: 2022-09-21

## 2022-09-21 VITALS
WEIGHT: 223 LBS | DIASTOLIC BLOOD PRESSURE: 76 MMHG | HEIGHT: 71 IN | OXYGEN SATURATION: 98 % | RESPIRATION RATE: 16 BRPM | BODY MASS INDEX: 31.22 KG/M2 | SYSTOLIC BLOOD PRESSURE: 126 MMHG | HEART RATE: 64 BPM

## 2022-09-21 DIAGNOSIS — F41.9 ANXIETY: ICD-10-CM

## 2022-09-21 DIAGNOSIS — Z12.11 SCREENING FOR COLON CANCER: ICD-10-CM

## 2022-09-21 DIAGNOSIS — Z01.818 PREPROCEDURAL EXAMINATION: ICD-10-CM

## 2022-09-21 DIAGNOSIS — Z86.79 HISTORY OF PRIMARY HYPERTENSION: ICD-10-CM

## 2022-09-21 DIAGNOSIS — I10 ESSENTIAL HYPERTENSION: ICD-10-CM

## 2022-09-21 DIAGNOSIS — R94.31 ABNORMAL EKG: ICD-10-CM

## 2022-09-21 DIAGNOSIS — Z86.16 HISTORY OF COVID-19: ICD-10-CM

## 2022-09-21 DIAGNOSIS — R41.840 ATTENTION DEFICIT: Primary | ICD-10-CM

## 2022-09-21 DIAGNOSIS — Z11.59 ENCOUNTER FOR SCREENING FOR OTHER VIRAL DISEASES: ICD-10-CM

## 2022-09-21 DIAGNOSIS — R94.39 ABNORMAL STRESS TEST: Primary | ICD-10-CM

## 2022-09-21 PROCEDURE — 99214 OFFICE O/P EST MOD 30 MIN: CPT | Performed by: FAMILY MEDICINE

## 2022-09-21 PROCEDURE — 3008F BODY MASS INDEX DOCD: CPT | Performed by: FAMILY MEDICINE

## 2022-09-21 PROCEDURE — 3078F DIAST BP <80 MM HG: CPT | Performed by: FAMILY MEDICINE

## 2022-09-21 PROCEDURE — 3074F SYST BP LT 130 MM HG: CPT | Performed by: FAMILY MEDICINE

## 2022-09-21 RX ORDER — VILOXAZINE HYDROCHLORIDE 200 MG/1
CAPSULE, EXTENDED RELEASE ORAL
Qty: 42 CAPSULE | Refills: 0 | Status: SHIPPED | OUTPATIENT
Start: 2022-09-21 | End: 2022-10-19

## 2022-09-21 NOTE — PATIENT INSTRUCTIONS
1. Start Laverta Founds for ADHD. Start with 1 capsule daily for 14 days. Then if symptoms still uncontrolled, can increase to 2 capsules daily. Call back if having any side effects. Send us an update through [employer] if you would like to continue medication. 2. Get clearance from cardiologist regarding stimulant medication. Complete stress echocardiogram. Schedule 1 month teleheatlh follow up    3. Follow up with surgeon regarding colonoscopy.

## 2022-09-26 ENCOUNTER — PATIENT MESSAGE (OUTPATIENT)
Dept: FAMILY MEDICINE CLINIC | Facility: CLINIC | Age: 52
End: 2022-09-26

## 2022-09-26 NOTE — TELEPHONE ENCOUNTER
Pt sent message stating Bobby Appl is being held up by insurance and is requesting for another medication until this med can be filled. Pt has stress echo scheduled for the 29th. Please advise. Thank you.    LOV: 09/21/22

## 2022-09-26 NOTE — TELEPHONE ENCOUNTER
From: Janett Jara  To: Geo Juarez MD  Sent: 9/26/2022 3:22 PM CDT  Subject: Medication prescribed    The qelbree prescription is being held up at my insurance request. Is there another prescription that we may be able to fill. I am doing my stress echo on the 29th. The nurse from dr Lisa Barnes office requests that we get a release form sent to them for approval after my test on the 29th.

## 2022-09-27 RX ORDER — BUPROPION HYDROCHLORIDE 150 MG/1
150 TABLET ORAL DAILY
Qty: 30 TABLET | Refills: 1 | Status: SHIPPED | OUTPATIENT
Start: 2022-09-27

## 2022-10-08 ENCOUNTER — LAB ENCOUNTER (OUTPATIENT)
Dept: LAB | Age: 52
End: 2022-10-08
Attending: INTERNAL MEDICINE
Payer: COMMERCIAL

## 2022-10-08 DIAGNOSIS — F41.9 ANXIETY: ICD-10-CM

## 2022-10-08 DIAGNOSIS — R94.39 ABNORMAL STRESS TEST: ICD-10-CM

## 2022-10-10 LAB — SARS-COV-2 RNA RESP QL NAA+PROBE: NOT DETECTED

## 2022-10-11 ENCOUNTER — HOSPITAL ENCOUNTER (OUTPATIENT)
Dept: CV DIAGNOSTICS | Age: 52
Discharge: HOME | End: 2022-10-11
Attending: INTERNAL MEDICINE
Payer: COMMERCIAL

## 2022-10-11 DIAGNOSIS — F41.9 ANXIETY: ICD-10-CM

## 2022-10-11 DIAGNOSIS — Z86.79 HISTORY OF PRIMARY HYPERTENSION: ICD-10-CM

## 2022-10-11 DIAGNOSIS — R94.39 ABNORMAL STRESS TEST: ICD-10-CM

## 2022-10-11 PROCEDURE — 93018 CV STRESS TEST I&R ONLY: CPT | Performed by: INTERNAL MEDICINE

## 2022-10-11 PROCEDURE — 93017 CV STRESS TEST TRACING ONLY: CPT | Performed by: INTERNAL MEDICINE

## 2022-10-11 PROCEDURE — 93350 STRESS TTE ONLY: CPT | Performed by: INTERNAL MEDICINE

## 2022-10-12 ENCOUNTER — PATIENT MESSAGE (OUTPATIENT)
Dept: FAMILY MEDICINE CLINIC | Facility: CLINIC | Age: 52
End: 2022-10-12

## 2022-10-12 NOTE — TELEPHONE ENCOUNTER
From: Negra Crews  To: Jennifer Lizarraga MD  Sent: 10/12/2022 2:10 PM CDT  Subject: Cardiac stress echo    Can we follow up now to see if we can administer other medications for the attention deficit

## 2022-10-24 ENCOUNTER — PATIENT MESSAGE (OUTPATIENT)
Dept: FAMILY MEDICINE CLINIC | Facility: CLINIC | Age: 52
End: 2022-10-24

## 2022-10-24 NOTE — TELEPHONE ENCOUNTER
From: Margaret Mckeon  To: Flory Longo MD  Sent: 10/24/2022 11:47 AM CDT  Subject: Medication question    Hello. I started the adderall last thur 10/20. At around 8 pm last 2 nights I start to feel the downing effect from medicine. I find myself with a very dry mouth and I start biting at my lip, rubbing my tongue around the inside of my mouth, and feeling a bit of cramping. This goes away after about 2 hours. Is there a non-stimulant we can try? Or a lesser dose? The effects are already apparent with my daily rountine.

## 2022-10-25 NOTE — TELEPHONE ENCOUNTER
Patient states that focus is \"extremely improved. \"    Please approve or deny pending rx. Thank you.

## 2022-10-26 RX ORDER — DEXTROAMPHETAMINE SACCHARATE, AMPHETAMINE ASPARTATE MONOHYDRATE, DEXTROAMPHETAMINE SULFATE AND AMPHETAMINE SULFATE 2.5; 2.5; 2.5; 2.5 MG/1; MG/1; MG/1; MG/1
10 CAPSULE, EXTENDED RELEASE ORAL EVERY MORNING
Qty: 30 CAPSULE | Refills: 0 | Status: SHIPPED | OUTPATIENT
Start: 2022-10-26 | End: 2022-11-25

## 2022-10-28 RX ORDER — BUPROPION HYDROCHLORIDE 150 MG/1
TABLET ORAL
Qty: 30 TABLET | Refills: 1 | Status: SHIPPED | OUTPATIENT
Start: 2022-10-28

## 2022-11-23 ENCOUNTER — PATIENT MESSAGE (OUTPATIENT)
Dept: FAMILY MEDICINE CLINIC | Facility: CLINIC | Age: 52
End: 2022-11-23

## 2022-11-23 DIAGNOSIS — I10 ESSENTIAL HYPERTENSION: ICD-10-CM

## 2022-11-23 RX ORDER — BUPROPION HYDROCHLORIDE 150 MG/1
150 TABLET ORAL DAILY
Qty: 90 TABLET | Refills: 1 | Status: SHIPPED | OUTPATIENT
Start: 2022-11-23

## 2022-11-23 RX ORDER — DEXTROAMPHETAMINE SACCHARATE, AMPHETAMINE ASPARTATE MONOHYDRATE, DEXTROAMPHETAMINE SULFATE AND AMPHETAMINE SULFATE 5; 5; 5; 5 MG/1; MG/1; MG/1; MG/1
20 CAPSULE, EXTENDED RELEASE ORAL DAILY
Qty: 30 CAPSULE | Refills: 0 | Status: SHIPPED | OUTPATIENT
Start: 2022-12-24 | End: 2023-01-23

## 2022-11-23 RX ORDER — DEXTROAMPHETAMINE SACCHARATE, AMPHETAMINE ASPARTATE MONOHYDRATE, DEXTROAMPHETAMINE SULFATE AND AMPHETAMINE SULFATE 5; 5; 5; 5 MG/1; MG/1; MG/1; MG/1
20 CAPSULE, EXTENDED RELEASE ORAL DAILY
Qty: 30 CAPSULE | Refills: 0 | Status: SHIPPED | OUTPATIENT
Start: 2023-01-24 | End: 2023-02-23

## 2022-11-23 RX ORDER — LOSARTAN POTASSIUM AND HYDROCHLOROTHIAZIDE 12.5; 5 MG/1; MG/1
1 TABLET ORAL DAILY
Qty: 90 TABLET | Refills: 1 | Status: SHIPPED | OUTPATIENT
Start: 2022-11-23

## 2022-11-23 RX ORDER — BUPROPION HYDROCHLORIDE 150 MG/1
150 TABLET ORAL DAILY
Qty: 30 TABLET | Refills: 1 | OUTPATIENT
Start: 2022-11-23

## 2022-11-23 RX ORDER — DEXTROAMPHETAMINE SACCHARATE, AMPHETAMINE ASPARTATE MONOHYDRATE, DEXTROAMPHETAMINE SULFATE AND AMPHETAMINE SULFATE 5; 5; 5; 5 MG/1; MG/1; MG/1; MG/1
20 CAPSULE, EXTENDED RELEASE ORAL DAILY
Qty: 30 CAPSULE | Refills: 0 | Status: SHIPPED | OUTPATIENT
Start: 2022-11-23 | End: 2022-12-23

## 2022-11-23 NOTE — TELEPHONE ENCOUNTER
From: Dominga Salazar  To: Peri Deng MD  Sent: 11/23/2022 10:22 AM CST  Subject: medication follow up     Hi. Im updating my progress for medications I have been prescribed. I requested a refill for losartan, 50/12.5. I have 9 left from a 90 count. My blood pressure has normalized where I was previous to starting Adderall. My blood pressure machine is reading 135/76. I requested a lower adderall amount 10mg, and used it for a few days and returned to the 20mg. I have 22 left for 10 mg. The 20 mg is working much better and I feel the same overall well being daily. I would like to continue with the 20 mg. I am taking the wellbutrin in combination as well. The overall effects are consistent and my overall anxiousness, concentration, and emotions have all improved tremendously. Let me know if I need a visit in person or video. Please let me know if refills will be issued. The pharmacy did not alert me last time.

## 2022-11-23 NOTE — TELEPHONE ENCOUNTER
Very good to hear that he has improved symptoms. Okay for 3 mo supply of Adderall XR 20mg daily. Okay for refills on wellbutrin, losartan. F/u OV in 3mo.

## 2022-11-23 NOTE — TELEPHONE ENCOUNTER
Requesting refill on bupropion. Last VV 10/19/2022. LF 10/28/2022 for 30 days with one refill. Rx request denied. Too soon.

## 2023-02-24 PROBLEM — G47.33 OSA (OBSTRUCTIVE SLEEP APNEA): Status: ACTIVE | Noted: 2023-02-24

## 2023-02-24 PROBLEM — I82.412 ACUTE DEEP VEIN THROMBOSIS (DVT) OF FEMORAL VEIN OF LEFT LOWER EXTREMITY (HCC): Status: RESOLVED | Noted: 2021-09-03 | Resolved: 2023-02-24

## 2023-02-24 PROBLEM — F90.9 ATTENTION DEFICIT HYPERACTIVITY DISORDER (ADHD): Status: ACTIVE | Noted: 2023-02-24

## 2023-02-24 PROBLEM — J12.82 PNEUMONIA DUE TO COVID-19 VIRUS: Status: RESOLVED | Noted: 2021-08-14 | Resolved: 2023-02-24

## 2023-02-24 PROBLEM — U07.1 PNEUMONIA DUE TO COVID-19 VIRUS: Status: RESOLVED | Noted: 2021-08-14 | Resolved: 2023-02-24

## 2023-02-24 PROBLEM — E87.1 HYPONATREMIA: Status: RESOLVED | Noted: 2021-08-14 | Resolved: 2023-02-24

## 2023-06-16 ENCOUNTER — PATIENT MESSAGE (OUTPATIENT)
Dept: FAMILY MEDICINE CLINIC | Facility: CLINIC | Age: 53
End: 2023-06-16

## 2023-06-16 NOTE — TELEPHONE ENCOUNTER
Lab orders placed 2/24/2023. Have not completed yet. Last VV 2/24/2023. Advised to return in 3 months.

## 2023-08-24 ENCOUNTER — OFFICE VISIT (OUTPATIENT)
Dept: FAMILY MEDICINE CLINIC | Facility: CLINIC | Age: 53
End: 2023-08-24
Payer: COMMERCIAL

## 2023-08-24 ENCOUNTER — LAB ENCOUNTER (OUTPATIENT)
Dept: LAB | Age: 53
End: 2023-08-24
Attending: FAMILY MEDICINE
Payer: COMMERCIAL

## 2023-08-24 ENCOUNTER — HOSPITAL ENCOUNTER (OUTPATIENT)
Dept: GENERAL RADIOLOGY | Age: 53
Discharge: HOME | End: 2023-08-24
Attending: FAMILY MEDICINE
Payer: COMMERCIAL

## 2023-08-24 VITALS
BODY MASS INDEX: 29.54 KG/M2 | HEIGHT: 71 IN | RESPIRATION RATE: 16 BRPM | HEART RATE: 66 BPM | OXYGEN SATURATION: 98 % | SYSTOLIC BLOOD PRESSURE: 132 MMHG | DIASTOLIC BLOOD PRESSURE: 84 MMHG | WEIGHT: 211 LBS

## 2023-08-24 DIAGNOSIS — G89.29 CHRONIC PAIN OF LEFT ANKLE: ICD-10-CM

## 2023-08-24 DIAGNOSIS — G47.33 OSA (OBSTRUCTIVE SLEEP APNEA): ICD-10-CM

## 2023-08-24 DIAGNOSIS — F41.9 ANXIETY: ICD-10-CM

## 2023-08-24 DIAGNOSIS — I10 ESSENTIAL HYPERTENSION: Primary | ICD-10-CM

## 2023-08-24 DIAGNOSIS — Z12.11 COLON CANCER SCREENING: ICD-10-CM

## 2023-08-24 DIAGNOSIS — M25.572 CHRONIC PAIN OF LEFT ANKLE: ICD-10-CM

## 2023-08-24 DIAGNOSIS — Z00.00 LABORATORY EXAM ORDERED AS PART OF ROUTINE GENERAL MEDICAL EXAMINATION: ICD-10-CM

## 2023-08-24 DIAGNOSIS — F90.9 ATTENTION DEFICIT HYPERACTIVITY DISORDER (ADHD), UNSPECIFIED ADHD TYPE: ICD-10-CM

## 2023-08-24 LAB
ALBUMIN SERPL-MCNC: 4.2 G/DL (ref 3.4–5)
ALBUMIN/GLOB SERPL: 1.4 {RATIO} (ref 1–2)
ALP LIVER SERPL-CCNC: 45 U/L
ALT SERPL-CCNC: 45 U/L
ANION GAP SERPL CALC-SCNC: 3 MMOL/L (ref 0–18)
AST SERPL-CCNC: 24 U/L (ref 15–37)
BASOPHILS # BLD AUTO: 0.05 X10(3) UL (ref 0–0.2)
BASOPHILS NFR BLD AUTO: 0.7 %
BILIRUB SERPL-MCNC: 0.4 MG/DL (ref 0.1–2)
BUN BLD-MCNC: 15 MG/DL (ref 7–18)
CALCIUM BLD-MCNC: 9.4 MG/DL (ref 8.5–10.1)
CHLORIDE SERPL-SCNC: 110 MMOL/L (ref 98–112)
CHOLEST SERPL-MCNC: 147 MG/DL (ref ?–200)
CO2 SERPL-SCNC: 30 MMOL/L (ref 21–32)
CREAT BLD-MCNC: 0.97 MG/DL
EGFRCR SERPLBLD CKD-EPI 2021: 94 ML/MIN/1.73M2 (ref 60–?)
EOSINOPHIL # BLD AUTO: 0.06 X10(3) UL (ref 0–0.7)
EOSINOPHIL NFR BLD AUTO: 0.9 %
ERYTHROCYTE [DISTWIDTH] IN BLOOD BY AUTOMATED COUNT: 13.1 %
FASTING PATIENT LIPID ANSWER: YES
FASTING STATUS PATIENT QL REPORTED: YES
GLOBULIN PLAS-MCNC: 2.9 G/DL (ref 2.8–4.4)
GLUCOSE BLD-MCNC: 95 MG/DL (ref 70–99)
HCT VFR BLD AUTO: 45.4 %
HDLC SERPL-MCNC: 53 MG/DL (ref 40–59)
HGB BLD-MCNC: 15.1 G/DL
IMM GRANULOCYTES # BLD AUTO: 0.03 X10(3) UL (ref 0–1)
IMM GRANULOCYTES NFR BLD: 0.4 %
LDLC SERPL CALC-MCNC: 83 MG/DL (ref ?–100)
LYMPHOCYTES # BLD AUTO: 1.45 X10(3) UL (ref 1–4)
LYMPHOCYTES NFR BLD AUTO: 20.7 %
MCH RBC QN AUTO: 30.6 PG (ref 26–34)
MCHC RBC AUTO-ENTMCNC: 33.3 G/DL (ref 31–37)
MCV RBC AUTO: 92.1 FL
MONOCYTES # BLD AUTO: 0.35 X10(3) UL (ref 0.1–1)
MONOCYTES NFR BLD AUTO: 5 %
NEUTROPHILS # BLD AUTO: 5.08 X10 (3) UL (ref 1.5–7.7)
NEUTROPHILS # BLD AUTO: 5.08 X10(3) UL (ref 1.5–7.7)
NEUTROPHILS NFR BLD AUTO: 72.3 %
NONHDLC SERPL-MCNC: 94 MG/DL (ref ?–130)
OSMOLALITY SERPL CALC.SUM OF ELEC: 297 MOSM/KG (ref 275–295)
PLATELET # BLD AUTO: 303 10(3)UL (ref 150–450)
POTASSIUM SERPL-SCNC: 4.2 MMOL/L (ref 3.5–5.1)
PROT SERPL-MCNC: 7.1 G/DL (ref 6.4–8.2)
RBC # BLD AUTO: 4.93 X10(6)UL
SODIUM SERPL-SCNC: 143 MMOL/L (ref 136–145)
T4 FREE SERPL-MCNC: 1 NG/DL (ref 0.8–1.7)
TRIGL SERPL-MCNC: 50 MG/DL (ref 30–149)
TSI SER-ACNC: 1.15 MIU/ML (ref 0.36–3.74)
VLDLC SERPL CALC-MCNC: 8 MG/DL (ref 0–30)
WBC # BLD AUTO: 7 X10(3) UL (ref 4–11)

## 2023-08-24 PROCEDURE — 3008F BODY MASS INDEX DOCD: CPT | Performed by: FAMILY MEDICINE

## 2023-08-24 PROCEDURE — 99214 OFFICE O/P EST MOD 30 MIN: CPT | Performed by: FAMILY MEDICINE

## 2023-08-24 PROCEDURE — 80061 LIPID PANEL: CPT

## 2023-08-24 PROCEDURE — 84443 ASSAY THYROID STIM HORMONE: CPT

## 2023-08-24 PROCEDURE — 85025 COMPLETE CBC W/AUTO DIFF WBC: CPT

## 2023-08-24 PROCEDURE — 3075F SYST BP GE 130 - 139MM HG: CPT | Performed by: FAMILY MEDICINE

## 2023-08-24 PROCEDURE — 73610 X-RAY EXAM OF ANKLE: CPT | Performed by: FAMILY MEDICINE

## 2023-08-24 PROCEDURE — 80053 COMPREHEN METABOLIC PANEL: CPT

## 2023-08-24 PROCEDURE — 3079F DIAST BP 80-89 MM HG: CPT | Performed by: FAMILY MEDICINE

## 2023-08-24 PROCEDURE — 84439 ASSAY OF FREE THYROXINE: CPT

## 2023-08-24 RX ORDER — BUPROPION HYDROCHLORIDE 150 MG/1
150 TABLET ORAL DAILY
Qty: 90 TABLET | Refills: 1 | Status: SHIPPED | OUTPATIENT
Start: 2023-08-24

## 2023-08-24 RX ORDER — LOSARTAN POTASSIUM AND HYDROCHLOROTHIAZIDE 12.5; 5 MG/1; MG/1
1 TABLET ORAL DAILY
Qty: 90 TABLET | Refills: 1 | Status: SHIPPED | OUTPATIENT
Start: 2023-08-24

## 2023-08-24 RX ORDER — DEXTROAMPHETAMINE SACCHARATE, AMPHETAMINE ASPARTATE MONOHYDRATE, DEXTROAMPHETAMINE SULFATE AND AMPHETAMINE SULFATE 3.75; 3.75; 3.75; 3.75 MG/1; MG/1; MG/1; MG/1
15 CAPSULE, EXTENDED RELEASE ORAL EVERY MORNING
Qty: 30 CAPSULE | Refills: 0 | Status: SHIPPED | OUTPATIENT
Start: 2023-08-24 | End: 2023-09-23

## 2023-08-24 NOTE — PATIENT INSTRUCTIONS
Start lower dose of adderall. Send us condition update in next few weeks.    Complete blood work  Complete x-ray   Follow up with surgeon for colon cancer screening  Follow up with podiatrist.

## 2023-10-20 ENCOUNTER — PATIENT MESSAGE (OUTPATIENT)
Dept: FAMILY MEDICINE CLINIC | Facility: CLINIC | Age: 53
End: 2023-10-20

## 2023-10-20 ENCOUNTER — OFFICE VISIT (OUTPATIENT)
Dept: FAMILY MEDICINE CLINIC | Facility: CLINIC | Age: 53
End: 2023-10-20
Payer: COMMERCIAL

## 2023-10-20 VITALS
RESPIRATION RATE: 16 BRPM | HEART RATE: 67 BPM | TEMPERATURE: 97 F | WEIGHT: 220 LBS | HEIGHT: 72 IN | SYSTOLIC BLOOD PRESSURE: 142 MMHG | BODY MASS INDEX: 29.8 KG/M2 | DIASTOLIC BLOOD PRESSURE: 88 MMHG | OXYGEN SATURATION: 98 %

## 2023-10-20 DIAGNOSIS — Z91.148 POOR COMPLIANCE WITH MEDICATION: ICD-10-CM

## 2023-10-20 DIAGNOSIS — I10 ESSENTIAL HYPERTENSION: ICD-10-CM

## 2023-10-20 DIAGNOSIS — L50.9 HIVES OF UNKNOWN ORIGIN: Primary | ICD-10-CM

## 2023-10-20 PROCEDURE — 3008F BODY MASS INDEX DOCD: CPT | Performed by: NURSE PRACTITIONER

## 2023-10-20 PROCEDURE — 3077F SYST BP >= 140 MM HG: CPT | Performed by: NURSE PRACTITIONER

## 2023-10-20 PROCEDURE — 99213 OFFICE O/P EST LOW 20 MIN: CPT | Performed by: NURSE PRACTITIONER

## 2023-10-20 PROCEDURE — 3079F DIAST BP 80-89 MM HG: CPT | Performed by: NURSE PRACTITIONER

## 2023-10-20 RX ORDER — PREDNISONE 20 MG/1
40 TABLET ORAL DAILY
Qty: 10 TABLET | Refills: 0 | Status: SHIPPED | OUTPATIENT
Start: 2023-10-20 | End: 2023-10-25

## 2023-10-20 NOTE — TELEPHONE ENCOUNTER
From: Emerald Martinez  To: Eilzabeth Hallman  Sent: 10/20/2023 7:22 AM CDT  Subject: Request for guidance     Last Thursday evening I started to develop a rash on my chest area. It itches slightly.  It seems to be spreading    Please advise

## 2023-11-27 ENCOUNTER — PATIENT MESSAGE (OUTPATIENT)
Dept: FAMILY MEDICINE CLINIC | Facility: CLINIC | Age: 53
End: 2023-11-27

## 2023-11-27 DIAGNOSIS — F90.9 ATTENTION DEFICIT HYPERACTIVITY DISORDER (ADHD), UNSPECIFIED ADHD TYPE: ICD-10-CM

## 2023-11-27 RX ORDER — DEXTROAMPHETAMINE SACCHARATE, AMPHETAMINE ASPARTATE MONOHYDRATE, DEXTROAMPHETAMINE SULFATE AND AMPHETAMINE SULFATE 3.75; 3.75; 3.75; 3.75 MG/1; MG/1; MG/1; MG/1
15 CAPSULE, EXTENDED RELEASE ORAL EVERY MORNING
Qty: 30 CAPSULE | Refills: 0 | Status: SHIPPED | OUTPATIENT
Start: 2023-11-27 | End: 2023-12-27

## 2023-11-27 NOTE — TELEPHONE ENCOUNTER
From: Negra Crews  To: Jennifer Lizarraga  Sent: 11/27/2023 8:16 AM CST  Subject: Refill    Can I get a refill on the 15mg adderall

## 2023-11-27 NOTE — TELEPHONE ENCOUNTER
Pt sent message requesting refill of Adderall 15 daily. Please approve or deny pending Rx. Thank you. LOV: 08/24/23 return in about 3 months around 11/24/23. Appt scheduled for 12/07/23.    LF: 10/06/23

## 2024-02-12 ENCOUNTER — PATIENT MESSAGE (OUTPATIENT)
Dept: FAMILY MEDICINE CLINIC | Facility: CLINIC | Age: 54
End: 2024-02-12

## 2024-02-12 DIAGNOSIS — F90.9 ATTENTION DEFICIT HYPERACTIVITY DISORDER (ADHD), UNSPECIFIED ADHD TYPE: ICD-10-CM

## 2024-02-12 RX ORDER — DEXTROAMPHETAMINE SACCHARATE, AMPHETAMINE ASPARTATE MONOHYDRATE, DEXTROAMPHETAMINE SULFATE AND AMPHETAMINE SULFATE 3.75; 3.75; 3.75; 3.75 MG/1; MG/1; MG/1; MG/1
15 CAPSULE, EXTENDED RELEASE ORAL EVERY MORNING
Qty: 30 CAPSULE | Refills: 0 | Status: SHIPPED | OUTPATIENT
Start: 2024-02-12 | End: 2024-03-13

## 2024-02-12 NOTE — TELEPHONE ENCOUNTER
From: Colin Helm  To: George Grayson  Sent: 2/12/2024 2:13 PM CST  Subject: Refill request     Hello. Can I get a refill on the 15mg adderall. I will also make an appointment now that I’m travel free for a period of time.

## 2024-03-28 ENCOUNTER — HOSPITAL ENCOUNTER (EMERGENCY)
Age: 54
Discharge: HOME | End: 2024-03-28
Attending: EMERGENCY MEDICINE
Payer: COMMERCIAL

## 2024-03-28 VITALS
OXYGEN SATURATION: 98 % | DIASTOLIC BLOOD PRESSURE: 97 MMHG | HEART RATE: 52 BPM | BODY MASS INDEX: 30 KG/M2 | RESPIRATION RATE: 18 BRPM | SYSTOLIC BLOOD PRESSURE: 143 MMHG | TEMPERATURE: 98 F | WEIGHT: 220.44 LBS

## 2024-03-28 DIAGNOSIS — H43.812 POSTERIOR VITREOUS DETACHMENT OF LEFT EYE: Primary | ICD-10-CM

## 2024-03-28 LAB — GLUCOSE BLD-MCNC: 93 MG/DL (ref 70–99)

## 2024-03-28 PROCEDURE — 93010 ELECTROCARDIOGRAM REPORT: CPT

## 2024-03-28 PROCEDURE — 82962 GLUCOSE BLOOD TEST: CPT

## 2024-03-28 PROCEDURE — 99284 EMERGENCY DEPT VISIT MOD MDM: CPT

## 2024-03-28 PROCEDURE — 93005 ELECTROCARDIOGRAM TRACING: CPT

## 2024-03-28 NOTE — ED PROVIDER NOTES
Patient Seen in: [facility] [place]      History     Chief Complaint   Patient presents with    Eye Visual Problem     Stated Complaint: \"thready vision\" and \"bright spots\" that started approx 2 hours ago. left sided*    Subjective:   HPI    Developed flashers and floaters about 3 hours ago.  Out of his left eye, left eye elevation.  There is no visual field cut and there is no blind spots contrary to what suggested above.  He is just very annoyed by the constant \"twitching\".    Objective:   Past Medical History:   Diagnosis Date    Acute deep vein thrombosis (DVT) of femoral vein of left lower extremity (HCC) 9/3/2021    Acute pulmonary embolism (HCC)     ADD (attention deficit disorder)     COVID 2021    inpt for 14 days    Essential hypertension     PE (pulmonary thromboembolism) (HCC) 2021    w covid               Past Surgical History:   Procedure Laterality Date    REPAIR ING HERNIA,5+Y/O,REDUCIBL                  Social History     Socioeconomic History    Marital status:    Tobacco Use    Smoking status: Never    Smokeless tobacco: Never   Substance and Sexual Activity    Alcohol use: Yes     Comment:  socially     Drug use: Never              Review of Systems    Positive for stated complaint: \"thready vision\" and \"bright spots\" that started approx 2 hours ago. left sided*  Other systems are as noted in HPI.  Constitutional and vital signs reviewed.      All other systems reviewed and negative except as noted above.    Physical Exam     ED Triage Vitals   BP 03/28/24 1633 (!) 164/99   Pulse 03/28/24 1633 60   Resp 03/28/24 1633 14   Temp 03/28/24 1812 98 °F (36.7 °C)   Temp src 03/28/24 1812 Oral   SpO2 03/28/24 1631 97 %   O2 Device 03/28/24 1633 None (Room air)       Current:BP (!) 143/97   Pulse 52   Temp 98 °F (36.7 °C) (Oral)   Resp 18   Wt 100 kg   SpO2 98%   BMI 29.90 kg/m²         Physical Exam    Physical Exam   Constitutional: Awake, alert, well  appearing  Head: Normocephalic and atraumatic.   Eyes: Conjunctivae are normal. Pupils are equal, round, and reactive to light.   Extraocular motions full and painless.  Pupils react to direct and consensual light.    No photophobia    Anterior chambers clear bilaterally    Ultrasound of left globe does not show any obvious retinal detachment      Neck: Normal range of motion. Neck supple.   Cardiovascular: Normal rate, regular rhythm  Pulmonary/Chest: Normal effort.  No accessory muscle use.  No clubbing, no cyanosis.  Abdominal: Soft. Bowel sounds are normal.   Neurological: Pt is alert and oriented to person, place, and time. no cranial nerve deficits  Skin: Skin is warm and dry.    ED Course     Labs Reviewed   POCT GLUCOSE - Normal   RAINBOW DRAW LAVENDER   RAINBOW DRAW LIGHT GREEN   RAINBOW DRAW BLUE     EKG    Rate, intervals and axes as noted on EKG Report.  Rate: 56  Rhythm: Sinus Rhythm  Reading: Sinus bradycardia without acute ischemia                          MDM          Differential diagnoses considered: Retinal attachment, posterior vitreous detachment, CRAO, CRVO all considered    -Given no blind spots or visual field cuts suspect posterior vitreous detachment    -Outpatient Optho follow-up 3 to 5 days    -Follow-up urgently or return to the ER if he develops blind spots or visual field cuts        *Discussion of ongoing management of this patient's care included: n/a  *Comorbidities contributing to the complexity of decision making: History of myopia  *External charts reviewed: n/a  *Additional sources of history: n/a    Shared decision making was done by: patient, myself.                                     Medical Decision Making      Disposition and Plan     Clinical Impression:  1. Posterior vitreous detachment of left eye         Disposition:  Discharge  3/28/2024  5:52 pm    Follow-up:  Codey Mosqueda MD  [address]  [address]  [facility] [address]  [phone]    Schedule an  appointment as soon as possible for a visit            Medications Prescribed:  Current Discharge Medication List

## 2024-03-28 NOTE — ED QUICK NOTES
Patient provided information and address for [facility] and [facility] due to request for local follow up.

## 2024-03-31 LAB
ATRIAL RATE: 56 BPM
P AXIS: 76 DEGREES
P-R INTERVAL: 186 MS
Q-T INTERVAL: 434 MS
QRS DURATION: 98 MS
QTC CALCULATION (BEZET): 418 MS
R AXIS: 56 DEGREES
T AXIS: 41 DEGREES
VENTRICULAR RATE: 56 BPM

## 2024-04-20 ENCOUNTER — LAB ENCOUNTER (OUTPATIENT)
Dept: LAB | Age: 54
End: 2024-04-20
Attending: FAMILY MEDICINE
Payer: COMMERCIAL

## 2024-04-20 ENCOUNTER — OFFICE VISIT (OUTPATIENT)
Dept: FAMILY MEDICINE CLINIC | Facility: CLINIC | Age: 54
End: 2024-04-20
Payer: COMMERCIAL

## 2024-04-20 VITALS
WEIGHT: 219 LBS | OXYGEN SATURATION: 98 % | HEART RATE: 63 BPM | BODY MASS INDEX: 29.66 KG/M2 | HEIGHT: 72 IN | RESPIRATION RATE: 16 BRPM | DIASTOLIC BLOOD PRESSURE: 78 MMHG | SYSTOLIC BLOOD PRESSURE: 136 MMHG

## 2024-04-20 DIAGNOSIS — R22.1 NECK SWELLING: ICD-10-CM

## 2024-04-20 DIAGNOSIS — Z00.00 WELLNESS EXAMINATION: Primary | ICD-10-CM

## 2024-04-20 DIAGNOSIS — Z86.16 HISTORY OF COVID-19: ICD-10-CM

## 2024-04-20 DIAGNOSIS — H43.812 POSTERIOR VITREOUS DETACHMENT OF LEFT EYE: ICD-10-CM

## 2024-04-20 DIAGNOSIS — F90.9 ATTENTION DEFICIT HYPERACTIVITY DISORDER (ADHD), UNSPECIFIED ADHD TYPE: ICD-10-CM

## 2024-04-20 DIAGNOSIS — Z12.5 SCREENING FOR MALIGNANT NEOPLASM OF PROSTATE: ICD-10-CM

## 2024-04-20 DIAGNOSIS — Z12.11 SCREENING FOR COLON CANCER: ICD-10-CM

## 2024-04-20 DIAGNOSIS — I10 PRIMARY HYPERTENSION: ICD-10-CM

## 2024-04-20 DIAGNOSIS — Z00.00 LABORATORY EXAMINATION ORDERED AS PART OF A ROUTINE GENERAL MEDICAL EXAMINATION: ICD-10-CM

## 2024-04-20 LAB
ALBUMIN SERPL-MCNC: 4.2 G/DL (ref 3.4–5)
ALBUMIN/GLOB SERPL: 1.4 {RATIO} (ref 1–2)
ALP LIVER SERPL-CCNC: 57 U/L
ALT SERPL-CCNC: 28 U/L
ANION GAP SERPL CALC-SCNC: 2 MMOL/L (ref 0–18)
AST SERPL-CCNC: 18 U/L (ref 15–37)
BASOPHILS # BLD AUTO: 0.04 X10(3) UL (ref 0–0.2)
BASOPHILS NFR BLD AUTO: 0.9 %
BILIRUB SERPL-MCNC: 0.6 MG/DL (ref 0.1–2)
BUN BLD-MCNC: 11 MG/DL (ref 9–23)
CALCIUM BLD-MCNC: 9.4 MG/DL (ref 8.5–10.1)
CHLORIDE SERPL-SCNC: 110 MMOL/L (ref 98–112)
CHOLEST SERPL-MCNC: 197 MG/DL (ref ?–200)
CO2 SERPL-SCNC: 31 MMOL/L (ref 21–32)
COMPLEXED PSA SERPL-MCNC: 2.55 NG/ML (ref ?–4)
CREAT BLD-MCNC: 0.86 MG/DL
EGFRCR SERPLBLD CKD-EPI 2021: 104 ML/MIN/1.73M2 (ref 60–?)
EOSINOPHIL # BLD AUTO: 0.07 X10(3) UL (ref 0–0.7)
EOSINOPHIL NFR BLD AUTO: 1.5 %
ERYTHROCYTE [DISTWIDTH] IN BLOOD BY AUTOMATED COUNT: 12.7 %
FASTING PATIENT LIPID ANSWER: YES
FASTING STATUS PATIENT QL REPORTED: YES
GLOBULIN PLAS-MCNC: 2.9 G/DL (ref 2.8–4.4)
GLUCOSE BLD-MCNC: 89 MG/DL (ref 70–99)
HCT VFR BLD AUTO: 46.7 %
HDLC SERPL-MCNC: 58 MG/DL (ref 40–59)
HGB BLD-MCNC: 15.4 G/DL
IMM GRANULOCYTES # BLD AUTO: 0.01 X10(3) UL (ref 0–1)
IMM GRANULOCYTES NFR BLD: 0.2 %
LDLC SERPL CALC-MCNC: 119 MG/DL (ref ?–100)
LYMPHOCYTES # BLD AUTO: 1.17 X10(3) UL (ref 1–4)
LYMPHOCYTES NFR BLD AUTO: 25.2 %
MCH RBC QN AUTO: 30.8 PG (ref 26–34)
MCHC RBC AUTO-ENTMCNC: 33 G/DL (ref 31–37)
MCV RBC AUTO: 93.4 FL
MONOCYTES # BLD AUTO: 0.34 X10(3) UL (ref 0.1–1)
MONOCYTES NFR BLD AUTO: 7.3 %
NEUTROPHILS # BLD AUTO: 3.02 X10 (3) UL (ref 1.5–7.7)
NEUTROPHILS # BLD AUTO: 3.02 X10(3) UL (ref 1.5–7.7)
NEUTROPHILS NFR BLD AUTO: 64.9 %
NONHDLC SERPL-MCNC: 139 MG/DL (ref ?–130)
OSMOLALITY SERPL CALC.SUM OF ELEC: 295 MOSM/KG (ref 275–295)
PLATELET # BLD AUTO: 297 10(3)UL (ref 150–450)
POTASSIUM SERPL-SCNC: 4.2 MMOL/L (ref 3.5–5.1)
PROT SERPL-MCNC: 7.1 G/DL (ref 6.4–8.2)
RBC # BLD AUTO: 5 X10(6)UL
SODIUM SERPL-SCNC: 143 MMOL/L (ref 136–145)
TRIGL SERPL-MCNC: 112 MG/DL (ref 30–149)
TSI SER-ACNC: 1.86 MIU/ML (ref 0.36–3.74)
VLDLC SERPL CALC-MCNC: 20 MG/DL (ref 0–30)
WBC # BLD AUTO: 4.7 X10(3) UL (ref 4–11)

## 2024-04-20 PROCEDURE — 80061 LIPID PANEL: CPT

## 2024-04-20 PROCEDURE — 3078F DIAST BP <80 MM HG: CPT | Performed by: FAMILY MEDICINE

## 2024-04-20 PROCEDURE — 86769 SARS-COV-2 COVID-19 ANTIBODY: CPT

## 2024-04-20 PROCEDURE — 99214 OFFICE O/P EST MOD 30 MIN: CPT | Performed by: FAMILY MEDICINE

## 2024-04-20 PROCEDURE — 80053 COMPREHEN METABOLIC PANEL: CPT

## 2024-04-20 PROCEDURE — 99396 PREV VISIT EST AGE 40-64: CPT | Performed by: FAMILY MEDICINE

## 2024-04-20 PROCEDURE — 84443 ASSAY THYROID STIM HORMONE: CPT

## 2024-04-20 PROCEDURE — 3075F SYST BP GE 130 - 139MM HG: CPT | Performed by: FAMILY MEDICINE

## 2024-04-20 PROCEDURE — 96127 BRIEF EMOTIONAL/BEHAV ASSMT: CPT | Performed by: FAMILY MEDICINE

## 2024-04-20 PROCEDURE — 85025 COMPLETE CBC W/AUTO DIFF WBC: CPT

## 2024-04-20 PROCEDURE — 3008F BODY MASS INDEX DOCD: CPT | Performed by: FAMILY MEDICINE

## 2024-04-20 RX ORDER — LOSARTAN POTASSIUM 50 MG/1
50 TABLET ORAL DAILY
Qty: 90 TABLET | Refills: 1 | Status: SHIPPED | OUTPATIENT
Start: 2024-04-20

## 2024-04-20 RX ORDER — DEXTROAMPHETAMINE SACCHARATE, AMPHETAMINE ASPARTATE MONOHYDRATE, DEXTROAMPHETAMINE SULFATE AND AMPHETAMINE SULFATE 3.75; 3.75; 3.75; 3.75 MG/1; MG/1; MG/1; MG/1
15 CAPSULE, EXTENDED RELEASE ORAL EVERY MORNING
COMMUNITY
Start: 2024-02-12 | End: 2024-04-20

## 2024-04-20 RX ORDER — LISDEXAMFETAMINE DIMESYLATE CAPSULES 20 MG/1
20 CAPSULE ORAL EVERY MORNING
Qty: 30 CAPSULE | Refills: 0 | Status: SHIPPED | OUTPATIENT
Start: 2024-04-20

## 2024-04-20 NOTE — PROGRESS NOTES
HPI:   Colin Helm is a 54 year old male who presents for an Annual Health Visit.     Seen in ER last month for PVD of L eye. He has been following with ophthalmology. Plan for continued surveillance.     HTN - has not been taking losartan-hydrochlorothiazide daily. Has been trying to manage without medication.    ADHD - has been having facial tics with adderall. Does not take it daily. Denies any chest pains, palpitations, insomnia.     Reports R sided neck/jawk lump that has been there for 3-4mo. No fevers, chills, night sweats, unintentional wt changes.     Allergies:   No Known Allergies    CURRENT MEDICATIONS   Current Outpatient Medications   Medication Sig Dispense Refill    losartan 50 MG Oral Tab Take 1 tablet (50 mg total) by mouth daily. 90 tablet 1    lisdexamfetamine (VYVANSE) 20 MG Oral Cap Take 1 capsule (20 mg total) by mouth every morning. 30 capsule 0      HISTORICAL INFORMATION   Past Medical History:    Acute deep vein thrombosis (DVT) of femoral vein of left lower extremity (HCC)    Acute pulmonary embolism (HCC)    ADD (attention deficit disorder)    Anxiety    Calculus of kidney    COVID    inpt for 14 days    Depression    Essential hypertension    PE (pulmonary thromboembolism) (HCC)    w covid     Sleep apnea      Past Surgical History:   Procedure Laterality Date    Colonoscopy  2009    Hernia surgery  2010    umbilical mesh    Repair ing hernia,6+y/o,reducibl      Skin surgery  2017    skin cancer removed    Vasectomy  2006      Family History   Problem Relation Age of Onset    Cancer Mother       SOCIAL HISTORY   Social History     Socioeconomic History    Marital status:    Tobacco Use    Smoking status: Never    Smokeless tobacco: Never   Substance and Sexual Activity    Alcohol use: Yes     Alcohol/week: 1.0 standard drink of alcohol     Types: 1 Glasses of wine per week     Comment:  socially     Drug use: Never   Other Topics Concern    Caffeine Concern No    Exercise  No    Seat Belt No    Special Diet No    Stress Concern No    Weight Concern No     Social Determinants of Health     Financial Resource Strain: Low Risk  (8/30/2021)    Received from [facility]    Overall Financial Resource Strain (CARDIA)     Difficulty of Paying Living Expenses: Not hard at all   Transportation Needs: No Transportation Needs (8/30/2021)    [facility]    PRAPARE - Transportation     Lack of Transportation (Medical): No     Lack of Transportation (Non-Medical): No     Social History     Social History Narrative    Not on file        REVIEW OF SYSTEMS:     Constitutional: negative  Eyes: negative  ENT:  see HPI  Respiratory: negative  Cardiovascular: negative  Gastrointestinal: negative  Integument/Breast: negative  Genitourinary: negative  Heme/Lymph: negative  Musculoskeletal: negative  Neurological: negative  Psych: negative  Endocrine: negative  Allergic/Immune: negative    EXAM:   /78   Pulse 63   Resp 16   Ht 6' (1.829 m)   Wt 219 lb (99.3 kg)   SpO2 98%   BMI 29.70 kg/m²    Wt Readings from Last 6 Encounters:   04/20/24 219 lb (99.3 kg)   03/28/24 220 lb 7.4 oz (100 kg)   10/20/23 220 lb (99.8 kg)   08/24/23 211 lb (95.7 kg)   09/21/22 223 lb (101.2 kg)   03/09/22 225 lb (102.1 kg)     Body mass index is 29.7 kg/m².    General: alert, appears stated age, and cooperative  Head: Normocephalic, without obvious abnormality, atraumatic  Eyes: negative  Ears: normal TM's and external ear canals both ears  Nose: Nares normal. Septum midline. Mucosa normal. No drainage or sinus tenderness.  Throat: lips, mucosa, and tongue normal; teeth and gums normal  Neck: supple, symmetrical, trachea midline, thyroid not enlarged, symmetric, no tenderness/mass/nodules, and R lower jaw swelling, nontender, ~1cm,   Heart: S1, S2 normal, no murmur, click, rub or gallop, regular rate and rhythm  Lungs: clear to auscultation  bilaterally  Chest wall: no tenderness  Abdomen: soft, non-tender; bowel sounds normal; no masses,  no organomegaly  : deferred  Back: negative  Extremities: extremities normal, atraumatic, no cyanosis or edema  Pulses: 2+ and symmetric  Skin: Skin color, texture, turgor normal. No rashes or lesions  Lymph Nodes:  No LAD  Neurologic: Grossly normal    ASSESSMENT AND PLAN:   Colin was seen today for well adult, lymph node, adhd and blood pressure.    Diagnoses and all orders for this visit:    Wellness examination  -Immunizations: UTD   -Metabolic: BMI 29. BP wnl. Due for annual labs   -Cancer screening: due for CRC, PSA screening   -Communicable disease: low risk   -Mental health: no concerns   -Other preventative: follow with dentistry and optometry.   -Lifestyle: Follow a well balanced healthy diet with emphasis on fruits, vegetables, whole grains, lean meats. Limit processed and junk foods. Aim for at least 150 minutes of moderate intensity exercise weekly. Make sure you are staying adequately hydrated. Aim to get 7-9 hours of sleep nightly.     Laboratory examination ordered as part of a routine general medical examination  -     CBC With Differential With Platelet; Future  -     Comp Metabolic Panel (14); Future  -     Lipid Panel; Future  -     TSH W Reflex To Free T4; Future    Screening for malignant neoplasm of prostate  -     PSA Total, Screen; Future    Screening for colon cancer  -     Surgery Referral - In Network    History of COVID-19  -     SARS-CoV-2 Total Antibody [E]; Future    Posterior vitreous detachment of left eye  Following with ophthalmology. CTM.     Primary hypertension  Will adjust to losartan 50mg. Continue low sodium diet, regular exercise, monitor BP at home. Goal <140/90. F/u 6mo or sooner prn.   -     losartan 50 MG Oral Tab; Take 1 tablet (50 mg total) by mouth daily.    Attention deficit hyperactivity disorder (ADHD), unspecified ADHD type  Trial vyvanse. He will send us  condition update on how he is doing with it.     ADHD - Evaluation and treatment of ADHD discussed with patient at length. Patient is willing to continue stimulant type medication treatment for this condition. Side effects and risks of medication explained in detail including but not limited to anorexia, weight loss, tremor, and anxiety. Patient understands and agrees to the above plan.     Discussed further plan of care and I have made it clear that no refills of any medication will be given without appropriate follow-up and continued office visits as deemed necessary.  I have explained that stimulant medication is of high risk and always has the potential of abuse and misuse.     -     lisdexamfetamine (VYVANSE) 20 MG Oral Cap; Take 1 capsule (20 mg total) by mouth every morning.    Neck swelling  Will obtain imaging w/ US.   -     US HEAD/NECK (CPT=76536); Future        Patient Instructions     Health Screening Guidelines, Men Ages 50 to 64   Screening tests and health counseling are a key part of managing your health. A screening test is done to find disorders or diseases in people who don't have any symptoms. Screening tests are not used to diagnose. They are used to find out if more testing is needed. The goal may be to find a disease early so it can be treated with more success. Or the goal may be to find a disease early so you can make lifestyle changes. You may need regular checkups to help you reduce your risk of disease.   Below are guidelines for men ages 50 to 64. Talk with your healthcare provider. Make sure you’re up-to-date on what you need.   We understand gender is a spectrum. We may use gendered terms to talk about anatomy and health risk. Please use this information in a way that works best for you and your provider as you talk about your care.   Screening  Who needs it How often    Unhealthy alcohol use  All men in this age group  At routine exams   Blood pressure All men in this age group  Once  a year if your blood pressure is normal. Normal blood pressure is less than 120/80 mm Hg. If your blood pressure is higher than this, follow the advice of your healthcare provider.    Colorectal cancer All men at average risk in this age group  Talk with your healthcare provider about which test below is right for you:   Colonoscopy every 10 years  Flexible sigmoidoscopy every 5 years (or every 10 years with yearly fecal immunochemical test (FIT) stool test)  CT colonography (virtual colonoscopy) every 5 years  Yearly fecal occult blood test  Yearly FIT  Stool DNA test every 1 to 3 years  If you have a test that is not a colonoscopy and have an abnormal test result, you will need a colonoscopy.   You may need to be screened more or less often. This is based on personal or family health history. Talk with your healthcare provider.    Depression All men in this age group  At routine exams   Type 2 diabetes or prediabetes  All in this age group  At least every 3 years (yearly if your blood sugar has already begun to rise)    Type 2 diabetes All men with prediabetes  Every year   Hepatitis C Men at higher risk for infection. Test 1 time for men born between 1945 and 1965.  Talk with your healthcare provider.    High cholesterol or triglycerides  All men in this age group  At least every 4 to 6 years. Talk with your healthcare provider about your risk. Ask if you should be tested more often.    HIV All men in this age group  At least 1 time. Talk with your healthcare provider about your risk factors. Ask if you should be tested more often.    Lung cancer All men in this age group who are in fairly good health and are at higher risk for lung cancer, and who:   Smoke or quit in the past 15 years  Have a 20-pack per year smoking history (1 pack a day for 20 years or 2 packs a day for 10 years)  Expert groups vary in their advice. Talk with your healthcare provider.  Yearly lung cancer screening with a low-dose CT scan  (LDCT). Talk with your healthcare provider.    Obesity All men in this age group  At yearly routine exams    BMI (body mass index) All men in this age group Every year, to help find out if you are at a healthy weight for your height    Prostate cancer All men in this age group, talk with your healthcare provider about risks and benefits of a digital rectal exam (MIR) and prostate-specific antigen (PSA) screening  At routine exams if you decide to be tested.    Syphilis Men at higher risk for infection  At routine exams. Talk with your healthcare provider.    Tuberculosis Men at higher risk for infection  Talk with your healthcare provider    Vision All men in this age group  Talk with your healthcare provider   Health counseling  Who needs it  How often    Diet and exercise Men who are overweight or obese  When diagnosed, and then at routine exams    Sexually transmitted infection (STI) prevention  Men at higher risk for infection  At routine exams; talk with your healthcare provider    Use of tobacco and the health effects it can cause  All men in this age group  Every exam   [employer] last reviewed this educational content on 5/1/2021 © 2000-2023 [employer]. All rights reserved. This information is not intended as a substitute for professional medical care. Always follow your healthcare professional's instructions.          The patient indicates understanding of these issues and agrees to the plan.    Problem List:  Patient Active Problem List   Diagnosis    Primary hypertension    Other insomnia    Anxiety    Attention deficit hyperactivity disorder (ADHD)    FORTINO (obstructive sleep apnea)       George Grayson MD  4/20/2024  10:43 AM

## 2024-04-20 NOTE — PATIENT INSTRUCTIONS
Health Screening Guidelines, Men Ages 50 to 64   Screening tests and health counseling are a key part of managing your health. A screening test is done to find disorders or diseases in people who don't have any symptoms. Screening tests are not used to diagnose. They are used to find out if more testing is needed. The goal may be to find a disease early so it can be treated with more success. Or the goal may be to find a disease early so you can make lifestyle changes. You may need regular checkups to help you reduce your risk of disease.   Below are guidelines for men ages 50 to 64. Talk with your healthcare provider. Make sure you’re up-to-date on what you need.   We understand gender is a spectrum. We may use gendered terms to talk about anatomy and health risk. Please use this information in a way that works best for you and your provider as you talk about your care.   Screening  Who needs it How often    Unhealthy alcohol use  All men in this age group  At routine exams   Blood pressure All men in this age group  Once a year if your blood pressure is normal. Normal blood pressure is less than 120/80 mm Hg. If your blood pressure is higher than this, follow the advice of your healthcare provider.    Colorectal cancer All men at average risk in this age group  Talk with your healthcare provider about which test below is right for you:   Colonoscopy every 10 years  Flexible sigmoidoscopy every 5 years (or every 10 years with yearly fecal immunochemical test (FIT) stool test)  CT colonography (virtual colonoscopy) every 5 years  Yearly fecal occult blood test  Yearly FIT  Stool DNA test every 1 to 3 years  If you have a test that is not a colonoscopy and have an abnormal test result, you will need a colonoscopy.   You may need to be screened more or less often. This is based on personal or family health history. Talk with your healthcare provider.    Depression All men in this age group  At routine exams   Type 2  diabetes or prediabetes  All in this age group  At least every 3 years (yearly if your blood sugar has already begun to rise)    Type 2 diabetes All men with prediabetes  Every year   Hepatitis C Men at higher risk for infection. Test 1 time for men born between 1945 and 1965.  Talk with your healthcare provider.    High cholesterol or triglycerides  All men in this age group  At least every 4 to 6 years. Talk with your healthcare provider about your risk. Ask if you should be tested more often.    HIV All men in this age group  At least 1 time. Talk with your healthcare provider about your risk factors. Ask if you should be tested more often.    Lung cancer All men in this age group who are in fairly good health and are at higher risk for lung cancer, and who:   Smoke or quit in the past 15 years  Have a 20-pack per year smoking history (1 pack a day for 20 years or 2 packs a day for 10 years)  Expert groups vary in their advice. Talk with your healthcare provider.  Yearly lung cancer screening with a low-dose CT scan (LDCT). Talk with your healthcare provider.    Obesity All men in this age group  At yearly routine exams    BMI (body mass index) All men in this age group Every year, to help find out if you are at a healthy weight for your height    Prostate cancer All men in this age group, talk with your healthcare provider about risks and benefits of a digital rectal exam (MIR) and prostate-specific antigen (PSA) screening  At routine exams if you decide to be tested.    Syphilis Men at higher risk for infection  At routine exams. Talk with your healthcare provider.    Tuberculosis Men at higher risk for infection  Talk with your healthcare provider    Vision All men in this age group  Talk with your healthcare provider   Health counseling  Who needs it  How often    Diet and exercise Men who are overweight or obese  When diagnosed, and then at routine exams    Sexually transmitted infection (STI) prevention   Men at higher risk for infection  At routine exams; talk with your healthcare provider    Use of tobacco and the health effects it can cause  All men in this age group  Every exam   [employer] last reviewed this educational content on 5/1/2021 © 2000-2023 [employer]. All rights reserved. This information is not intended as a substitute for professional medical care. Always follow your healthcare professional's instructions.

## 2024-04-23 LAB
SARS-COV-2 AB, NUCLEOCAPSID: POSITIVE
SARS-COV-2 AB, NUCLEOCAPSID: POSITIVE

## 2024-04-29 ENCOUNTER — HOSPITAL ENCOUNTER (OUTPATIENT)
Dept: ULTRASOUND IMAGING | Age: 54
Discharge: HOME | End: 2024-04-29
Attending: FAMILY MEDICINE
Payer: COMMERCIAL

## 2024-04-29 DIAGNOSIS — R22.1 NECK SWELLING: ICD-10-CM

## 2024-04-29 PROCEDURE — 76536 US EXAM OF HEAD AND NECK: CPT | Performed by: FAMILY MEDICINE

## 2024-05-02 ENCOUNTER — PATIENT MESSAGE (OUTPATIENT)
Dept: FAMILY MEDICINE CLINIC | Facility: CLINIC | Age: 54
End: 2024-05-02

## 2024-05-03 ENCOUNTER — TELEPHONE (OUTPATIENT)
Dept: FAMILY MEDICINE CLINIC | Facility: CLINIC | Age: 54
End: 2024-05-03

## 2024-05-03 DIAGNOSIS — R22.1 MASS IN NECK: Primary | ICD-10-CM

## 2024-05-03 NOTE — TELEPHONE ENCOUNTER
----- Message from George Grayson MD sent at 5/1/2024 10:13 AM CDT -----  Cystic mass in area of palpation. 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

## 2024-05-06 NOTE — TELEPHONE ENCOUNTER
Desktime message sent regarding results, rx, and recommendation as noted below. Provided phone number for CT scheduling.        George Grayson MD  5/1/2024 10:13 AM CDT       Cystic mass in area of palpation. 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       Can you please review ultrasound from 4-29. It reads as though further testing may be needed. Otherwise I would like this lump removed.

## 2024-05-13 ENCOUNTER — PATIENT MESSAGE (OUTPATIENT)
Dept: FAMILY MEDICINE CLINIC | Facility: CLINIC | Age: 54
End: 2024-05-13

## 2024-05-13 ENCOUNTER — HOSPITAL ENCOUNTER (OUTPATIENT)
Dept: CT IMAGING | Age: 54
Discharge: HOME | End: 2024-05-13
Attending: FAMILY MEDICINE

## 2024-05-13 DIAGNOSIS — R22.1 MASS IN NECK: ICD-10-CM

## 2024-05-13 PROCEDURE — 70491 CT SOFT TISSUE NECK W/DYE: CPT | Performed by: FAMILY MEDICINE

## 2024-05-13 NOTE — TELEPHONE ENCOUNTER
George Grayson MD  5/13/2024  2:51 PM CDT       Discussed findings with patient; he will f/u with ENT    George Grayson MD  5/13/2024  2:46 PM CDT       CT shows mass; unclear if these are lymph nodes. Would recommend to see ENT for further evaluation.       PCP discussed results with pt.

## 2024-05-13 NOTE — TELEPHONE ENCOUNTER
From: Colin Helm  To: George Grayson  Sent: 5/13/2024 1:35 PM CDT  Subject: Ct follow up    Please review ct scan as it appears to require further tests

## 2024-05-17 ENCOUNTER — PATIENT MESSAGE (OUTPATIENT)
Dept: FAMILY MEDICINE CLINIC | Facility: CLINIC | Age: 54
End: 2024-05-17

## 2024-05-17 NOTE — TELEPHONE ENCOUNTER
From: Colin Helm  To: George Grayson  Sent: 2024 1:47 PM CDT  Subject: Colonoscopy    Can I have a referral for a colonoscopy. I think the old one has  as I do not see it

## 2024-05-22 ENCOUNTER — OFFICE VISIT (OUTPATIENT)
Facility: LOCATION | Age: 54
End: 2024-05-22

## 2024-05-22 DIAGNOSIS — R22.1 NECK MASS: Primary | ICD-10-CM

## 2024-05-22 PROCEDURE — 99204 OFFICE O/P NEW MOD 45 MIN: CPT | Performed by: OTOLARYNGOLOGY

## 2024-05-22 NOTE — PROGRESS NOTES
Colin Helm is a 54 year old male. No chief complaint on file.    HPI:   He has a history of neck mass.  Is been present since October.  It is not painful but at times he feels pressure.  At times it will swell more.  He has no sore throat associated.  He has had a few episodes of fever and maybe a few episodes of chills but nothing regularly.  Current Outpatient Medications   Medication Sig Dispense Refill    losartan 50 MG Oral Tab Take 1 tablet (50 mg total) by mouth daily. 90 tablet 1    lisdexamfetamine (VYVANSE) 20 MG Oral Cap Take 1 capsule (20 mg total) by mouth every morning. 30 capsule 0      Past Medical History:    Acute deep vein thrombosis (DVT) of femoral vein of left lower extremity (HCC)    Acute pulmonary embolism (HCC)    ADD (attention deficit disorder)    Anxiety    Calculus of kidney    COVID    inpt for 14 days    Depression    Essential hypertension    PE (pulmonary thromboembolism) (HCC)    w covid     Sleep apnea      Social History:  Social History     Socioeconomic History    Marital status:    Tobacco Use    Smoking status: Never    Smokeless tobacco: Never   Substance and Sexual Activity    Alcohol use: Yes     Alcohol/week: 1.0 standard drink of alcohol     Types: 1 Glasses of wine per week     Comment:  socially     Drug use: Never   Other Topics Concern    Caffeine Concern No    Exercise No    Seat Belt No    Special Diet No    Stress Concern No    Weight Concern No     Social Determinants of Health     Financial Resource Strain: Low Risk  (8/30/2021)    Received from [facility]    Overall Financial Resource Strain (CARDIA)     Difficulty of Paying Living Expenses: Not hard at all   Transportation Needs: No Transportation Needs (8/30/2021)    Received from [facility]    PRAPARE - Transportation     Lack of Transportation (Medical): No     Lack of Transportation (Non-Medical): No      Past Surgical History:    Procedure Laterality Date    Colonoscopy  2009    Hernia surgery  2010    umbilical mesh    Repair ing hernia,6+y/o,reducibl      Skin surgery  2017    skin cancer removed    Vasectomy  2006         REVIEW OF SYSTEMS:   GENERAL HEALTH: feels well otherwise  GENERAL : denies fever, chills, sweats, weight loss, weight gain  SKIN: denies any unusual skin lesions or rashes  RESPIRATORY: denies shortness of breath with exertion  NEURO: denies headaches    EXAM:   There were no vitals taken for this visit.    System Findings Details   Constitutional  Overall appearance - Normal.   Psychiatric  Orientation - Oriented to time, place, person & situation. Appropriate mood and affect.   Head/Face  Facial features -- Normal. Skull - Normal.   Eyes  Pupils equal ,round ,react to light and accomidate   Ears, Nose, Throat, Neck  Ears clear nose clear oral cavity clear oropharynx free of lesions neck is supple with adenopathy.  I do not necessarily palpate a mass.  I did bimanual palpation of the floor mouth and level 1 on the right-hand side.   Neurological  Memory - Normal. Cranial nerves - Cranial nerves II through XII grossly intact.   Lymph Detail  Submental. Submandibular. Anterior cervical. Posterior cervical. Supraclavicular.       ASSESSMENT AND PLAN:   1. Neck mass  CT of the neck reviewed.  This shows a cystic type structure at level 1 on the right.  This is may be why cannot palpated as it is so soft.  Possible etiologies include ranula associated with sublingual glands and less likely lymphangioma.  I would like him to undergo an ultrasound-guided needle biopsy of the mass.  He will then see me back after the above and I will make further recommendations.  - US BIOPSY SOFT TISSUE MASS(CPT=20206/79025); Future      The patient indicates understanding of these issues and agrees to the plan.    No follow-ups on file.    Ghanshyam Nuñez MD  5/22/2024  9:58 AM

## 2024-06-05 ENCOUNTER — APPOINTMENT (OUTPATIENT)
Dept: LAB | Facility: HOSPITAL | Age: 54
End: 2024-06-05
Attending: OTOLARYNGOLOGY
Payer: COMMERCIAL

## 2024-06-05 ENCOUNTER — HOSPITAL ENCOUNTER (OUTPATIENT)
Dept: ULTRASOUND IMAGING | Facility: HOSPITAL | Age: 54
Discharge: HOME | End: 2024-06-05
Attending: OTOLARYNGOLOGY
Payer: COMMERCIAL

## 2024-06-05 DIAGNOSIS — R22.1 NECK MASS: ICD-10-CM

## 2024-06-05 PROCEDURE — 87070 CULTURE OTHR SPECIMN AEROBIC: CPT | Performed by: OTOLARYNGOLOGY

## 2024-06-05 PROCEDURE — 88108 CYTOPATH CONCENTRATE TECH: CPT | Performed by: OTOLARYNGOLOGY

## 2024-06-05 PROCEDURE — 76942 ECHO GUIDE FOR BIOPSY: CPT | Performed by: OTOLARYNGOLOGY

## 2024-06-05 PROCEDURE — 20206 BIOPSY MUSCLE PERQ NEEDLE: CPT | Performed by: OTOLARYNGOLOGY

## 2024-06-05 PROCEDURE — 87205 SMEAR GRAM STAIN: CPT | Performed by: OTOLARYNGOLOGY

## 2024-06-05 PROCEDURE — 87075 CULTR BACTERIA EXCEPT BLOOD: CPT | Performed by: OTOLARYNGOLOGY

## 2024-06-05 PROCEDURE — 88305 TISSUE EXAM BY PATHOLOGIST: CPT | Performed by: OTOLARYNGOLOGY

## 2024-06-05 NOTE — PROCEDURES
Pt shares rt neck mass is much smaller.  US shows cyst w/out nodule or ST component.  US aspiration w/ 8 ml gelatinous orange red fluid to lab.  Comp-none.

## 2024-06-13 ENCOUNTER — OFFICE VISIT (OUTPATIENT)
Facility: LOCATION | Age: 54
End: 2024-06-13
Payer: COMMERCIAL

## 2024-06-13 DIAGNOSIS — R22.1 NECK MASS: Primary | ICD-10-CM

## 2024-06-13 PROCEDURE — 99214 OFFICE O/P EST MOD 30 MIN: CPT | Performed by: OTOLARYNGOLOGY

## 2024-06-13 NOTE — PROGRESS NOTES
Colin Helm is a 54 year old male. No chief complaint on file.    HPI:   He has a history of cystic neck mass on the right.  It started getting better and then has gone down even more after drainage procedure.  He has no sore throat or pain or fever associated    REVIEW OF SYSTEMS:   GENERAL HEALTH: feels well otherwise  GENERAL : denies fever, chills, sweats, weight loss, weight gain  SKIN: denies any unusual skin lesions or rashes  RESPIRATORY: denies shortness of breath with exertion  NEURO: denies headaches    EXAM:   There were no vitals taken for this visit.    System Findings Details   Constitutional  Overall appearance - Normal.   Psychiatric  Orientation - Oriented to time, place, person & situation. Appropriate mood and affect.   Head/Face  Facial features -- Normal. Skull - Normal.   Eyes  Pupils equal ,round ,react to light and accomidate   Ears, Nose, Throat, Neck  Ears clear nose clear cavity and oropharynx are free of lesions neck is supple without masses including bimanual palpation on the right.   Neurological  Memory - Normal. Cranial nerves - Cranial nerves II through XII grossly intact.   Lymph Detail  Submental. Submandibular. Anterior cervical. Posterior cervical. Supraclavicular.       ASSESSMENT AND PLAN:   1. Neck mass  CT scan once again reviewed which shows a cystic level 1 neck mass on the right.  Biopsy results are consistent with a cyst.  I do not palpate a cyst.  This could have represented a ranula which has since gone down.  I will be conservative for now.  If he should notice swelling or pain he will see me back otherwise I will want to recheck him in 3 months.      The patient indicates understanding of these issues and agrees to the plan.    Return in about 3 months (around 9/13/2024).    Ghanshyam Nuñez MD  6/13/2024  11:46 AM

## 2024-09-17 ENCOUNTER — OFFICE VISIT (OUTPATIENT)
Facility: LOCATION | Age: 54
End: 2024-09-17
Payer: COMMERCIAL

## 2024-09-17 ENCOUNTER — PATIENT MESSAGE (OUTPATIENT)
Dept: FAMILY MEDICINE CLINIC | Facility: CLINIC | Age: 54
End: 2024-09-17

## 2024-09-17 DIAGNOSIS — R39.198 URINARY DYSFUNCTION: Primary | ICD-10-CM

## 2024-09-17 DIAGNOSIS — R39.11 URINARY HESITANCY: ICD-10-CM

## 2024-09-17 DIAGNOSIS — R22.1 NECK MASS: Primary | ICD-10-CM

## 2024-09-17 PROCEDURE — 99213 OFFICE O/P EST LOW 20 MIN: CPT | Performed by: OTOLARYNGOLOGY

## 2024-09-17 NOTE — PROGRESS NOTES
Colin Helm is a 54 year old male.   Chief Complaint   Patient presents with    Cyst     HPI:   He has a history of level 1 neck mass on the right.  He has had no significant swelling has had no pain no dysphagia no sore throat no fever.    REVIEW OF SYSTEMS:   GENERAL HEALTH: feels well otherwise  GENERAL : denies fever, chills, sweats, weight loss, weight gain  SKIN: denies any unusual skin lesions or rashes  RESPIRATORY: denies shortness of breath with exertion  NEURO: denies headaches    EXAM:   There were no vitals taken for this visit.    System Findings Details   Constitutional  Overall appearance - Normal.   Psychiatric  Orientation - Oriented to time, place, person & situation. Appropriate mood and affect.   Head/Face  Facial features -- Normal. Skull - Normal.   Eyes  Pupils equal ,round ,react to light and accomidate   Ears, Nose, Throat, Neck  Oral cavity clear oropharynx clear neck supple without masses I do not palpate a mass bimanually at level 1 on the right-hand side.  Submandibular gland otherwise appears normal.   Neurological  Memory - Normal. Cranial nerves - Cranial nerves II through XII grossly intact.   Lymph Detail  Submental. Submandibular. Anterior cervical. Posterior cervical. Supraclavicular.       ASSESSMENT AND PLAN:   1. Neck mass  CT scan once again reviewed along with biopsy results from June.  This is suggesting a ranula.  He has had no return of symptoms.  The mass is gone down and I do not palpate a mass.  He will see me back in January or February for recheck.  If he should have pain swelling he will see me back sooner.      The patient indicates understanding of these issues and agrees to the plan.    No follow-ups on file.    Ghanshyam Nuñez MD  9/17/2024  9:43 AM

## 2024-09-18 NOTE — TELEPHONE ENCOUNTER
From: Colin Helm  To: George Grayson  Sent: 9/17/2024 10:08 AM CDT  Subject: Referral request     Is there a urologist that can be recommended for checkup and some concerns

## 2024-10-15 ENCOUNTER — OFFICE VISIT (OUTPATIENT)
Facility: LOCATION | Age: 54
End: 2024-10-15
Payer: COMMERCIAL

## 2024-10-15 VITALS
DIASTOLIC BLOOD PRESSURE: 84 MMHG | HEART RATE: 54 BPM | OXYGEN SATURATION: 97 % | TEMPERATURE: 98 F | SYSTOLIC BLOOD PRESSURE: 140 MMHG | RESPIRATION RATE: 18 BRPM

## 2024-10-15 DIAGNOSIS — Z12.11 SCREEN FOR COLON CANCER: Primary | ICD-10-CM

## 2024-10-15 RX ORDER — POLYETHYLENE GLYCOL 3350, SODIUM CHLORIDE, SODIUM BICARBONATE, POTASSIUM CHLORIDE 420; 11.2; 5.72; 1.48 G/4L; G/4L; G/4L; G/4L
POWDER, FOR SOLUTION ORAL
Qty: 1 EACH | Refills: 0 | Status: SHIPPED | OUTPATIENT
Start: 2024-10-15

## 2024-10-15 NOTE — H&P
Colin Helm is a 54 year old male  Chief Complaint   Patient presents with    Colonoscopy     NP- Cscope Consult- states last cscope years ago, denies polyps, possible, denies family hx of colon cancer, reports abdominal bloating and gas, denies rectal bleeding        REFERRED BY    Patient presents with for colonoscopy.  Patient states his last colonoscopy was in his 40s.  That was performed for another issue per the patient.  He denies change in bowel habits chronic abdominal pain or unexplained weight loss.  He states that his brother has some sort of intestinal problem he believes however there is no definite history of colon cancer or colon polyps in the family.  He denies blood per rectum        .           Past Medical History:    Acute deep vein thrombosis (DVT) of femoral vein of left lower extremity (HCC)    Acute pulmonary embolism (HCC)    ADD (attention deficit disorder)    Anxiety    Calculus of kidney    COVID    inpt for 14 days    Depression    Essential hypertension    PE (pulmonary thromboembolism) (HCC)    w covid     Sleep apnea     Past Surgical History:   Procedure Laterality Date    Colonoscopy  2009    Hernia surgery  2010    umbilical mesh    Repair ing hernia,5+y/o,reducibl      Skin surgery  2017    skin cancer removed    Vasectomy  2006     Medications Ordered Prior to Encounter[1]  @ALL@  Family History   Problem Relation Age of Onset    Cancer Mother      Social History     Socioeconomic History    Marital status:    Tobacco Use    Smoking status: Never    Smokeless tobacco: Never   Substance and Sexual Activity    Alcohol use: Yes     Alcohol/week: 1.0 standard drink of alcohol     Types: 1 Glasses of wine per week     Comment:  socially     Drug use: Never   Other Topics Concern    Caffeine Concern No    Exercise No    Seat Belt No    Special Diet No    Stress Concern No    Weight Concern No     Social Drivers of Health     Financial Resource Strain: Low Risk  (8/30/2021)     [facility], [facility]    Overall Financial Resource Strain (CARDIA)     Difficulty of Paying Living Expenses: Not hard at all   Transportation Needs: No Transportation Needs (8/30/2021)    Received from [facility], [facility]    PRAPARE - Transportation     Lack of Transportation (Medical): No     Lack of Transportation (Non-Medical): No       ROS     GENERAL HEALTH: otherwise feels well, no weight loss, no fever or chills  SKIN: denies any unusual skin rashes or jaundice  HEENT: denies nasal congestion, sinus pain or sore throat; hearing loss negative,   EYES , no diplopia or vision changes  RESPIRATORY: denies shortness of breath, wheezing or cough   CARDIOVASCULAR: denies chest pain or GERMAN; no palpitations   GI: denies nausea, vomiting, constipation, diarrhea; no rectal bleeding; no heartburn  GENITAL/: no dysuria, urgency or frequency, no tea colored urine  MUSCULOSKELETAL: no joint complaints upper or lower extremities  HEMATOLOGY: denies hx anemia; denies bruising or excessive bleeding  Endocrine: no weight gain or loss no hot or cold intolerance    EXAM     Blood pressure 140/84, pulse 54, temperature 97.6 °F (36.4 °C), temperature source Temporal, resp. rate 18, SpO2 97%.  GENERAL: well developed, well nourished male, in no apparent distress.    MENTAL STATUS :Alert, oriented x 3  PSYCH: normal mood and affect  SKIN: anicteric, no rashes, no bruising  EYES: PERRLA, EOMI, sclera anicteric,  conjunctiva without pallor  HEENT: normocephalic, atraumatic, TMs clear, nares patent, mouth moist, pharynx w/o erythema  NECK: supple, no JVD, no adenopathy, no thyromegaly  RESPIRATORY: clear to auscultation, no rales , rhonchi or wheezing  CARDIOVASCULAR: RRR, murmur negative  ABDOMEN: normal active BS, soft, nondistended  no HSM, no masses or hernias  LYMPHATIC: no axillary , supraclavicular or inguinal lymphadenopathy  EXTREMITIES: no cyanosis, clubbing or  edema, no atrophy, full ROM    STUDIES:     Hospital Outpatient Visit on 06/05/2024   Component Date Value Ref Range Status    Aerobic Culture Result 06/05/2024 No Growth 3 Days   Final    Aerobic Smear 06/05/2024 1+ WBCs seen   Final    Aerobic Smear 06/05/2024 No organisms seen   Final    Anaerobic Culture 06/05/2024 No Anaerobes isolated   Final    Case Report 06/05/2024    Final                    Value:Medical Cytology Report                           Case: [id number]                                   Authorizing Provider:  Ghanshyam Nuñez MD     Collected:           06/05/2024 10:01 AM          Ordering Location:     [facility] Ultrasound Received:            06/05/2024 02:40 PM          Pathologist:           Andi Potts MD                                                        Specimen:    Neck, rt neck cyst. 5ml thick red                                                          Final Diagnosis: 06/05/2024    Final                    Value:This result contains rich text formatting which cannot be displayed here.    Final Diagnosis Comment 06/05/2024    Final                    Value:This result contains rich text formatting which cannot be displayed here.    Clinical Information 06/05/2024    Final                    Value:This result contains rich text formatting which cannot be displayed here.    Non Gyne Interpretation  06/05/2024 Insufficient/Non-Diagnostic (A)    Final    Gross Description 06/05/2024    Final                    Value:This result contains rich text formatting which cannot be displayed here.       ASSESSMENT   Imp: Average risk screening colonoscopy  PLan: Colonoscopy discussed with patient risk of bleeding and bowel perforation with surgery to repair      Meds & Refills for this Visit:  Requested Prescriptions     Signed Prescriptions Disp Refills    PEG 3350-KCl-Na Bicarb-NaCl (TRILYTE) 420 g Oral Recon Soln 1 each 0     Sig: Starting at 4:00 pm the night before  procedure, drink 8 ounces of the prep every 15-20 minutes until finished       Imaging & Consults:  None       [1]   Current Outpatient Medications on File Prior to Visit   Medication Sig Dispense Refill    losartan 50 MG Oral Tab Take 1 tablet (50 mg total) by mouth daily. 90 tablet 1    lisdexamfetamine (VYVANSE) 20 MG Oral Cap Take 1 capsule (20 mg total) by mouth every morning. 30 capsule 0     No current facility-administered medications on file prior to visit.

## 2024-10-30 ENCOUNTER — OFFICE VISIT (OUTPATIENT)
Dept: FAMILY MEDICINE CLINIC | Facility: CLINIC | Age: 54
End: 2024-10-30
Payer: COMMERCIAL

## 2024-10-30 VITALS
RESPIRATION RATE: 18 BRPM | HEART RATE: 76 BPM | HEIGHT: 72 IN | DIASTOLIC BLOOD PRESSURE: 78 MMHG | BODY MASS INDEX: 30.34 KG/M2 | OXYGEN SATURATION: 97 % | SYSTOLIC BLOOD PRESSURE: 128 MMHG | WEIGHT: 224 LBS

## 2024-10-30 DIAGNOSIS — F41.9 ANXIETY: ICD-10-CM

## 2024-10-30 DIAGNOSIS — Z11.3 ROUTINE SCREENING FOR STI (SEXUALLY TRANSMITTED INFECTION): ICD-10-CM

## 2024-10-30 DIAGNOSIS — I10 PRIMARY HYPERTENSION: Primary | ICD-10-CM

## 2024-10-30 DIAGNOSIS — F90.9 ATTENTION DEFICIT HYPERACTIVITY DISORDER (ADHD), UNSPECIFIED ADHD TYPE: ICD-10-CM

## 2024-10-30 DIAGNOSIS — Z00.00 LABORATORY EXAM ORDERED AS PART OF ROUTINE GENERAL MEDICAL EXAMINATION: ICD-10-CM

## 2024-10-30 DIAGNOSIS — I83.90 VARICOSE VEIN: ICD-10-CM

## 2024-10-30 PROCEDURE — 3008F BODY MASS INDEX DOCD: CPT | Performed by: FAMILY MEDICINE

## 2024-10-30 PROCEDURE — 99214 OFFICE O/P EST MOD 30 MIN: CPT | Performed by: FAMILY MEDICINE

## 2024-10-30 PROCEDURE — 3074F SYST BP LT 130 MM HG: CPT | Performed by: FAMILY MEDICINE

## 2024-10-30 PROCEDURE — 3078F DIAST BP <80 MM HG: CPT | Performed by: FAMILY MEDICINE

## 2024-10-30 NOTE — PROGRESS NOTES
[facility] Family Medicine Office Note  Chief Complaint:   Chief Complaint   Patient presents with    Hypertension    ADHD    Varicose Veins     Back of left leg        HPI:   This is a 54 year old male coming in for follow up of chronic conditions.     HTN - has stopped losartan as he felt his blood pressure was low (he was feeling lightheadedness). He has cut out sugar/salt in his diet. He stopped drinking alcohol regularly. He has been monitoring blood pressure at home and it has been normal.     ADHD - has been off adderall/vyvanse d/t tremors/tics. Sx have been controlled.      Anxiety - he is going through relationship stressors w/ wife. They might be going through divorce. He has been more anxious recently. He is following with therapy.     Has varicose veins - has been having pain in area.     Past Medical History:    Acute deep vein thrombosis (DVT) of femoral vein of left lower extremity (HCC)    Acute pulmonary embolism (HCC)    ADD (attention deficit disorder)    Anxiety    Calculus of kidney    COVID    inpt for 14 days    Depression    Essential hypertension    PE (pulmonary thromboembolism) (HCC)    w covid     Sleep apnea     Past Surgical History:   Procedure Laterality Date    Colonoscopy  2009    Hernia surgery  2010    umbilical mesh    Repair ing hernia,6+y/o,reducibl      Skin surgery  2017    skin cancer removed    Vasectomy  2006     Social History:  Social History     Socioeconomic History    Marital status:    Tobacco Use    Smoking status: Never    Smokeless tobacco: Never   Vaping Use    Vaping status: Never Used   Substance and Sexual Activity    Alcohol use: Yes     Alcohol/week: 1.0 standard drink of alcohol     Types: 1 Glasses of wine per week     Comment:  socially     Drug use: Never   Other Topics Concern    Caffeine Concern No    Exercise No    Seat Belt No    Special Diet No    Stress Concern No    Weight Concern No     Social Drivers of Health     Financial  Resource Strain: Low Risk  (8/30/2021)    [facility]    Overall Financial Resource Strain (CARDIA)     Difficulty of Paying Living Expenses: Not hard at all   Transportation Needs: No Transportation Needs (8/30/2021)    [facility]    PRAPARE - Transportation     Lack of Transportation (Medical): No     Lack of Transportation (Non-Medical): No     Family History:  Family History   Problem Relation Age of Onset    Cancer Mother      Allergies:  Allergies[1]  Current Meds:  Current Outpatient Medications   Medication Sig Dispense Refill    PEG 3350-KCl-Na Bicarb-NaCl (TRILYTE) 420 g Oral Recon Soln Starting at 4:00 pm the night before procedure, drink 8 ounces of the prep every 15-20 minutes until finished (Patient not taking: Reported on 10/30/2024) 1 each 0      Counseling given: Not Answered       REVIEW OF SYSTEMS:   Review of Systems   A comprehensive 10 point review of systems was completed.  Pertinent positives and negatives noted in the the HPI.    EXAM:   /78   Pulse 76   Resp 18   Ht 6' (1.829 m)   Wt 224 lb (101.6 kg)   SpO2 97%   BMI 30.38 kg/m²  Estimated body mass index is 30.38 kg/m² as calculated from the following:    Height as of this encounter: 6' (1.829 m).    Weight as of this encounter: 224 lb (101.6 kg).   Vital signs reviewed.Appears stated age, well groomed.  Physical Exam  Vitals and nursing note reviewed.   Constitutional:       Appearance: Normal appearance.   HENT:      Head: Normocephalic and atraumatic.   Cardiovascular:      Rate and Rhythm: Normal rate and regular rhythm.      Pulses: Normal pulses.      Heart sounds: Normal heart sounds. No murmur heard.  Pulmonary:      Effort: Pulmonary effort is normal. No respiratory distress.      Breath sounds: Normal breath sounds. No stridor. No wheezing or rhonchi.   Skin:     Comments: LLE varicose veins visualized at posterior knee.     Neurological:      Mental Status: He is alert and oriented to person, place, and time.   Psychiatric:         Mood and Affect: Mood normal.          ASSESSMENT AND PLAN:   1. Primary hypertension  Well controlled off medications. Continue low sodium diet, regular exercise, work on wt loss. Monitor BP at home goal <140/90. F/u 6mo    2. Attention deficit hyperactivity disorder (ADHD), unspecified ADHD type  Stable, controlled off medications. Had associated side effects w/ stimulants. Advised to monitor symptoms.      3. Varicose vein  Refer to surgery.   - Surgery Referral - In Network    4. Laboratory exam ordered as part of routine general medical examination  - CBC With Diffeential With Platelet; Future  - Comp Metabolic Panel (14); Future  - Lipid Panel; Future  - TSH W Reflex To Free T4; Future    5. Anxiety  Following with therapy. Not interested in restarting medication at this time. Continue to monitor closely.       Meds & Refills for this Visit:  Requested Prescriptions      No prescriptions requested or ordered in this encounter       Health Maintenance:  Health Maintenance Due   Topic Date Due    DTaP,Tdap,and Td Vaccines (1 - Tdap) Never done    Colorectal Cancer Screening  03/06/2020    Zoster Vaccines (1 of 2) Never done    COVID-19 Vaccine (1 - 2023-24 season) Never done    Influenza Vaccine (1) Never done    HTN: BP Follow-Up  11/15/2024       Patient/Caregiver Education: Patient/Caregiver Education: There are no barriers to learning. Medical education done.   Outcome: Patient verbalizes understanding. Patient is notified to call with any questions, complications, allergies, or worsening or changing symptoms.  Patient is to call with any side effects or complications from the treatments as a result of today.     Problem List:  Patient Active Problem List   Diagnosis    Primary hypertension    Other insomnia    Anxiety    Attention deficit hyperactivity disorder (ADHD)    FORTINO (obstructive sleep  apnea)          [1] No Known Allergies

## 2024-11-07 ENCOUNTER — TELEPHONE (OUTPATIENT)
Facility: LOCATION | Age: 54
End: 2024-11-07

## 2024-11-07 NOTE — TELEPHONE ENCOUNTER
RADHA ESCAMILLA Patient  Member ID  [id number]    Date of Birth  1970-01-09    Gender  Male    Transaction Type  Outpatient Authorization    Organization  [facility]    Payer  [facility] logo     Certificate Information  Reference Number  [id number]    Status  NO ACTION REQUIRED    Message  Requested Service does not require preauthorization. We would strongly encourage you to check benefits for this service.    Member Information  Patient Name  RADHA ESCAMILLA    Patient Date of Birth  1970-01-09    Patient Gender  Male    Member ID  [id number]    Relationship to Subscriber  Self    Subscriber Name  RADHA ESCAMILLA    Requesting Provider     Name  JACK RENE    NPI  [id number]    Tax Id  [id number]    Specialty  [id number]    Provider Role  Provider    Address  [address] [place] [address]    Phone  [phone]    Fax  [phone]    Contact Name  ROBERTO CARLOS SCHMITT    Service Information  Service Type  2 - Surgical    [facility]    Service From - To Date  2024-11-21 - 2024-12-31    Level of Service  Elective    Diagnosis Code 1   - Encounter for screening for malignant neoplasm of colon    Procedure Code 1 (CPT/HCPCS)  61973 - DIAGNOSTIC COLONOSCOPY    Quantity  1 Units    Status  NO ACTION REQUIRED

## 2024-11-12 ENCOUNTER — TELEPHONE (OUTPATIENT)
Facility: LOCATION | Age: 54
End: 2024-11-12

## 2024-11-12 NOTE — TELEPHONE ENCOUNTER
left voicemail for patient letting him know that the script should not have , should be able to reqest the pharmacy to refill the script. But if he is talking about the actual medication expiring, it should not have since it was sent on .

## 2025-03-05 ENCOUNTER — PATIENT MESSAGE (OUTPATIENT)
Dept: FAMILY MEDICINE CLINIC | Facility: CLINIC | Age: 55
End: 2025-03-05

## 2025-03-05 ENCOUNTER — LAB ENCOUNTER (OUTPATIENT)
Dept: LAB | Age: 55
End: 2025-03-05
Attending: FAMILY MEDICINE
Payer: COMMERCIAL

## 2025-03-05 DIAGNOSIS — Z11.3 ROUTINE SCREENING FOR STI (SEXUALLY TRANSMITTED INFECTION): ICD-10-CM

## 2025-03-05 LAB — T PALLIDUM AB SER QL IA: NONREACTIVE

## 2025-03-05 PROCEDURE — 86780 TREPONEMA PALLIDUM: CPT | Performed by: FAMILY MEDICINE

## 2025-03-05 PROCEDURE — 87491 CHLMYD TRACH DNA AMP PROBE: CPT | Performed by: FAMILY MEDICINE

## 2025-03-05 PROCEDURE — 87389 HIV-1 AG W/HIV-1&-2 AB AG IA: CPT | Performed by: FAMILY MEDICINE

## 2025-03-05 PROCEDURE — 87591 N.GONORRHOEAE DNA AMP PROB: CPT | Performed by: FAMILY MEDICINE

## 2025-03-06 LAB
C TRACH DNA SPEC QL NAA+PROBE: NEGATIVE
N GONORRHOEA DNA SPEC QL NAA+PROBE: NEGATIVE

## 2025-04-21 ENCOUNTER — OFFICE VISIT (OUTPATIENT)
Dept: FAMILY MEDICINE CLINIC | Facility: CLINIC | Age: 55
End: 2025-04-21
Payer: COMMERCIAL

## 2025-04-21 ENCOUNTER — LAB ENCOUNTER (OUTPATIENT)
Dept: LAB | Age: 55
End: 2025-04-21
Attending: FAMILY MEDICINE
Payer: COMMERCIAL

## 2025-04-21 VITALS
RESPIRATION RATE: 18 BRPM | WEIGHT: 212 LBS | SYSTOLIC BLOOD PRESSURE: 138 MMHG | HEART RATE: 62 BPM | HEIGHT: 72 IN | BODY MASS INDEX: 28.71 KG/M2 | DIASTOLIC BLOOD PRESSURE: 78 MMHG | OXYGEN SATURATION: 97 %

## 2025-04-21 DIAGNOSIS — Z00.00 LABORATORY EXAM ORDERED AS PART OF ROUTINE GENERAL MEDICAL EXAMINATION: ICD-10-CM

## 2025-04-21 DIAGNOSIS — Z00.00 WELLNESS EXAMINATION: Primary | ICD-10-CM

## 2025-04-21 DIAGNOSIS — I10 PRIMARY HYPERTENSION: ICD-10-CM

## 2025-04-21 DIAGNOSIS — R51.9 ACUTE INTRACTABLE HEADACHE, UNSPECIFIED HEADACHE TYPE: ICD-10-CM

## 2025-04-21 DIAGNOSIS — F90.9 ATTENTION DEFICIT HYPERACTIVITY DISORDER (ADHD), UNSPECIFIED ADHD TYPE: ICD-10-CM

## 2025-04-21 DIAGNOSIS — R22.1 NECK MASS: ICD-10-CM

## 2025-04-21 LAB
ALBUMIN SERPL-MCNC: 4.6 G/DL (ref 3.2–4.8)
ALBUMIN/GLOB SERPL: 2.2 {RATIO} (ref 1–2)
ALP LIVER SERPL-CCNC: 48 U/L (ref 45–117)
ALT SERPL-CCNC: 16 U/L (ref 10–49)
ANION GAP SERPL CALC-SCNC: 7 MMOL/L (ref 0–18)
AST SERPL-CCNC: 21 U/L (ref ?–34)
BASOPHILS # BLD AUTO: 0.03 X10(3) UL (ref 0–0.2)
BASOPHILS NFR BLD AUTO: 0.6 %
BILIRUB SERPL-MCNC: 0.6 MG/DL (ref 0.3–1.2)
BUN BLD-MCNC: 15 MG/DL (ref 9–23)
CALCIUM BLD-MCNC: 9.8 MG/DL (ref 8.7–10.6)
CHLORIDE SERPL-SCNC: 107 MMOL/L (ref 98–112)
CHOLEST SERPL-MCNC: 191 MG/DL (ref ?–200)
CO2 SERPL-SCNC: 29 MMOL/L (ref 21–32)
CREAT BLD-MCNC: 0.91 MG/DL (ref 0.7–1.3)
EGFRCR SERPLBLD CKD-EPI 2021: 100 ML/MIN/1.73M2 (ref 60–?)
EOSINOPHIL # BLD AUTO: 0.03 X10(3) UL (ref 0–0.7)
EOSINOPHIL NFR BLD AUTO: 0.6 %
ERYTHROCYTE [DISTWIDTH] IN BLOOD BY AUTOMATED COUNT: 13 %
FASTING PATIENT LIPID ANSWER: YES
FASTING STATUS PATIENT QL REPORTED: YES
GLOBULIN PLAS-MCNC: 2.1 G/DL (ref 2–3.5)
GLUCOSE BLD-MCNC: 92 MG/DL (ref 70–99)
HCT VFR BLD AUTO: 43.8 % (ref 39–53)
HDLC SERPL-MCNC: 54 MG/DL (ref 40–59)
HGB BLD-MCNC: 14.3 G/DL (ref 13–17.5)
IMM GRANULOCYTES # BLD AUTO: 0.01 X10(3) UL (ref 0–1)
IMM GRANULOCYTES NFR BLD: 0.2 %
LDLC SERPL CALC-MCNC: 127 MG/DL (ref ?–100)
LYMPHOCYTES # BLD AUTO: 1.09 X10(3) UL (ref 1–4)
LYMPHOCYTES NFR BLD AUTO: 20.5 %
MCH RBC QN AUTO: 30.2 PG (ref 26–34)
MCHC RBC AUTO-ENTMCNC: 32.6 G/DL (ref 31–37)
MCV RBC AUTO: 92.4 FL (ref 80–100)
MONOCYTES # BLD AUTO: 0.36 X10(3) UL (ref 0.1–1)
MONOCYTES NFR BLD AUTO: 6.8 %
NEUTROPHILS # BLD AUTO: 3.79 X10 (3) UL (ref 1.5–7.7)
NEUTROPHILS # BLD AUTO: 3.79 X10(3) UL (ref 1.5–7.7)
NEUTROPHILS NFR BLD AUTO: 71.3 %
NONHDLC SERPL-MCNC: 137 MG/DL (ref ?–130)
OSMOLALITY SERPL CALC.SUM OF ELEC: 296 MOSM/KG (ref 275–295)
PLATELET # BLD AUTO: 292 10(3)UL (ref 150–450)
POTASSIUM SERPL-SCNC: 4.4 MMOL/L (ref 3.5–5.1)
PROT SERPL-MCNC: 6.7 G/DL (ref 5.7–8.2)
RBC # BLD AUTO: 4.74 X10(6)UL (ref 4.3–5.7)
SODIUM SERPL-SCNC: 143 MMOL/L (ref 136–145)
TRIGL SERPL-MCNC: 55 MG/DL (ref 30–149)
TSI SER-ACNC: 1.02 UIU/ML (ref 0.55–4.78)
VLDLC SERPL CALC-MCNC: 10 MG/DL (ref 0–30)
WBC # BLD AUTO: 5.3 X10(3) UL (ref 4–11)

## 2025-04-21 PROCEDURE — 36415 COLL VENOUS BLD VENIPUNCTURE: CPT

## 2025-04-21 PROCEDURE — 85025 COMPLETE CBC W/AUTO DIFF WBC: CPT

## 2025-04-21 PROCEDURE — 80053 COMPREHEN METABOLIC PANEL: CPT

## 2025-04-21 PROCEDURE — 84443 ASSAY THYROID STIM HORMONE: CPT

## 2025-04-21 PROCEDURE — 80061 LIPID PANEL: CPT

## 2025-04-21 RX ORDER — PERFLUOROHEXYLOCTANE 1 MG/MG
1 SOLUTION OPHTHALMIC EVERY 6 HOURS
COMMUNITY
Start: 2025-03-10

## 2025-04-21 RX ORDER — LOSARTAN POTASSIUM 50 MG/1
50 TABLET ORAL DAILY
Qty: 90 TABLET | Refills: 1 | Status: SHIPPED | OUTPATIENT
Start: 2025-04-21

## 2025-04-21 RX ORDER — LOSARTAN POTASSIUM 50 MG/1
TABLET ORAL DAILY
COMMUNITY
End: 2025-04-21

## 2025-04-21 NOTE — PROGRESS NOTES
The following individual(s) verbally consented to be recorded using ambient AI listening technology and understand that they can each withdraw their consent to this listening technology at any point by asking the clinician to turn off or pause the recording:    Patient name: Colin Helm  Additional names:

## 2025-04-21 NOTE — PROGRESS NOTES
Subjective:   Colin Helm is a 55 year old male who presents for Annual (Reviewed Preventative/Wellness form with patient./), Hypertension (200/100 2 weeks ago, pt states having headaches), and ADHD       History/Other:   History of Present Illness  Colin, a patient with a history of high blood pressure, presents for a physical. He expresses concerns about his blood pressure, which he has been monitoring at home. He restarted his Losartan medication about two weeks ago. He reports experiencing severe headaches, which he describes as intense and unlike any he has had before. These headaches have since resolved, but he occasionally feels hints of them. He denies any associated numbness/tingling, weakness. Denies any stroke like symptoms. Denies any headaches currently.     He has been dealing with relationship stress, which has been a source of anxiety for him. Despite this, he reports making positive lifestyle changes, including exercising more, improving his diet, and quitting drinking. He has lost weight as a result of these changes.    He developed a new left neck mass. H/o cystic benign mass on R. Has scheduled a f/u with ENT.      Chief Complaint Reviewed and Verified  Nursing Notes Reviewed and   Verified  Tobacco Reviewed  Allergies Reviewed  Medications Reviewed    Problem List Reviewed  Medical History Reviewed  Surgical History   Reviewed  Family History Reviewed  Social History Reviewed         Tobacco:  He has never smoked tobacco.    Current Medications[1]    PHQ-2 SCORE: 0  , done 4/21/2025     Review of Systems:  Pertinent items are noted in HPI.    Objective:   /78   Pulse 62   Resp 18   Ht 6' (1.829 m)   Wt 212 lb (96.2 kg)   SpO2 97%   BMI 28.75 kg/m²  Estimated body mass index is 28.75 kg/m² as calculated from the following:    Height as of this encounter: 6' (1.829 m).    Weight as of this encounter: 212 lb (96.2 kg).    Physical Exam  Vitals and nursing note reviewed.    Constitutional:       Appearance: Normal appearance.   HENT:      Head: Normocephalic and atraumatic.      Right Ear: Tympanic membrane, ear canal and external ear normal.      Left Ear: Tympanic membrane, ear canal and external ear normal.      Nose: Nose normal.      Mouth/Throat:      Mouth: Mucous membranes are moist.      Pharynx: Oropharynx is clear.   Eyes:      Pupils: Pupils are equal, round, and reactive to light.   Neck:      Comments: Soft mass palpated at L side of neck.   Cardiovascular:      Rate and Rhythm: Normal rate and regular rhythm.      Pulses: Normal pulses.      Heart sounds: Normal heart sounds. No murmur heard.  Pulmonary:      Effort: Pulmonary effort is normal. No respiratory distress.      Breath sounds: Normal breath sounds. No stridor. No wheezing or rhonchi.   Abdominal:      General: Abdomen is flat. Bowel sounds are normal. There is no distension.      Palpations: Abdomen is soft. There is no mass.      Tenderness: There is no abdominal tenderness.      Hernia: No hernia is present.   Musculoskeletal:      Cervical back: Normal range of motion.      Right lower leg: No edema.      Left lower leg: No edema.   Lymphadenopathy:      Cervical: No cervical adenopathy.   Skin:     Findings: No rash.   Neurological:      Mental Status: He is alert and oriented to person, place, and time.   Psychiatric:         Mood and Affect: Mood normal.       Assessment & Plan:   1. Wellness examination (Primary)  -Immunizations: Consider Tdap, prevnar, shingrex  -Metabolic: BMI 28. BP wnl. Due for annual labs   -Cancer screening: UTD  -Communicable disease: low risk   -Mental health: no concerns   -Other preventative: follow with dentistry and optometry.   -Lifestyle: Follow a well balanced healthy diet with emphasis on fruits, vegetables, whole grains, lean meats. Limit processed and junk foods. Aim for at least 150 minutes of moderate intensity exercise weekly. Make sure you are staying adequately  hydrated. Aim to get 7-9 hours of sleep nightly.     2. Primary hypertension  Controlled with losartan, restarted two weeks ago. Home readings slightly higher than office. Lifestyle changes improving control.  - Continue losartan.  - Monitor blood pressure at home.  - Follow low sodium diet, regular exercise   - Schedule nurse visit to compare home and office blood pressure readings. Bring in home monitor.   -     Losartan Potassium; Take 1 tablet (50 mg total) by mouth daily.  Dispense: 90 tablet; Refill: 1    3. Attention deficit hyperactivity disorder (ADHD), unspecified ADHD type  Controlled off medications - CPM.     4. Acute intractable headache, unspecified headache type  Severe headaches mostly resolved, occasional recurrence. Occipital location, stress and hypertension related. Family history of brain tumor causing anxiety.  - Order CT scan of the brain  - Consider anxiety management strategies.  -     Cancel: CT BRAIN OR HEAD (CPT=70450); Future; Expected date: 04/21/2025  -     CT BRAIN OR HEAD(CONTRAST ONLY) (CPT=70460); Future; Expected date: 04/21/2025    5. Neck mass  Has f/u with ENT      Return in about 6 months (around 10/21/2025).        George Grayson MD, 4/21/2025, 10:25 AM             [1]   Current Outpatient Medications   Medication Sig Dispense Refill    MIEBO 1.338 GM/ML Ophthalmic Solution Apply 1 drop to eye every 6 (six) hours.      losartan 50 MG Oral Tab Take 1 tablet (50 mg total) by mouth daily. 90 tablet 1    PEG 3350-KCl-Na Bicarb-NaCl (TRILYTE) 420 g Oral Recon Soln Starting at 4:00 pm the night before procedure, drink 8 ounces of the prep every 15-20 minutes until finished (Patient not taking: Reported on 4/21/2025) 1 each 0

## 2025-04-21 NOTE — PATIENT INSTRUCTIONS
Health Screening Guidelines, Men Ages 50 to 64   Screening tests and health counseling are a key part of managing your health. A screening test is done to find disorders or diseases in people who don't have any symptoms. Screening tests are not used to diagnose. They are used to find out if more testing is needed. The goal may be to find a disease early so it can be treated with more success. Or the goal may be to find a disease early so you can make lifestyle changes. You may need regular checkups to help you reduce your risk of disease.   Below are guidelines for men ages 50 to 64. Talk with your healthcare provider. Make sure you’re up-to-date on what you need.   We understand gender is a spectrum. We may use gendered terms to talk about anatomy and health risk. Please use this information in a way that works best for you and your provider as you talk about your care.   Screening Who needs it How often   Unhealthy alcohol use  All men in this age group  At routine exams   Blood pressure All men in this age group  Once a year if your blood pressure is normal. Normal blood pressure is less than 120/80 mm Hg. If your blood pressure is higher than this, follow the advice of your healthcare provider.    Colorectal cancer All men in this age group  Talk with your healthcare provider about which test below is right for you:   Colonoscopy every 10 years  Flexible sigmoidoscopy every 5 years or every 10 years with yearly fecal immunochemical test (FIT) stool test  CT colonography (virtual colonoscopy) every 5 years  Yearly fecal occult blood test  Yearly FIT  Stool FIT-DNA test (also called the stool DNA test) every 3 years  If you have a test that is not a colonoscopy and have an abnormal test result, you will need a colonoscopy.   You may need to be screened more or less often. This is based on personal or family health history. Talk with your healthcare provider.    Depression All men in this age group  At routine  exams   Type 2 diabetes or prediabetes  All men in this age group with no symptoms who are overweight or obese.  At least every 3 years (yearly if your blood sugar has already begun to rise)    Type 2 diabetes All men with prediabetes  Every year   Screening Who needs it How often   Hepatitis C All adults age 18 or older at least once in a lifetime.  Talk with your healthcare provider about your risk factors and how often to have hepatitis C screening.    High cholesterol or triglycerides  All men in this age group  About every 1 to 2 years. Expert groups vary in their advice. Talk with your healthcare provider about your risk factors and how often you should be tested.    HIV All men in this age group  At least 1 time. Talk with your healthcare provider about your risk factors. Ask if you should be tested more often.    Lung cancer All men in this age group who are in fairly good health and are at higher risk for lung cancer, and who:   Smoke or quit in the past 15 years  Have a 20-pack per year smoking history (1 pack a day for 20 years or 2 packs a day for 10 years)  Expert groups vary in their advice. Talk with your healthcare provider. Yearly lung cancer screening with a low-dose CT scan. Talk with your healthcare provider about your risk factors.    Obesity All men in this age group  At yearly routine exams    BMI (body mass index) All men in this age group Every year, to help find out if you are at a healthy weight for your height.    Prostate cancer All men in this age group, talk with your healthcare provider about risks and benefits of a digital rectal exam and prostate-specific antigen screening  At routine exams if you decide to be tested.    Syphilis Men at higher risk for infection  At routine exams. Talk with your healthcare provider.    Tuberculosis Men at higher risk for infection  Talk with your healthcare provider    Vision All men in this age group  Baseline screening at age 40. Talk with your  healthcare provider about how often to have vision exams.    Health counseling  Who needs it  How often    Diet and exercise Men who are overweight or obese  When diagnosed, and then at routine exams    Sexually transmitted infection prevention  Men at higher risk for infection  At routine exams, talk with your healthcare provider    Use of tobacco and the health effects it can cause  All men in this age group  Every exam   [employer] last reviewed this educational content on 4/1/2024  This information is for informational purposes only. This is not intended to be a substitute for professional medical advice, diagnosis, or treatment. Always seek the advice and follow the directions from your physician or other qualified health care provider.  © 2503-6910 [employer]. All rights reserved. This information is not intended as a substitute for professional medical care. Always follow your healthcare professional's instructions.

## 2025-04-23 ENCOUNTER — PATIENT MESSAGE (OUTPATIENT)
Age: 55
End: 2025-04-23

## 2025-04-24 ENCOUNTER — OFFICE VISIT (OUTPATIENT)
Facility: LOCATION | Age: 55
End: 2025-04-24
Payer: COMMERCIAL

## 2025-04-24 DIAGNOSIS — R22.1 NECK MASS: Primary | ICD-10-CM

## 2025-04-24 PROCEDURE — 99214 OFFICE O/P EST MOD 30 MIN: CPT | Performed by: OTOLARYNGOLOGY

## 2025-04-24 NOTE — PROGRESS NOTES
Colin Helm is a 55 year old male.   Chief Complaint   Patient presents with    Cyst     HPI:   He has a history of probable ranula at the right side of the neck last year.  He underwent biopsy which was consistent with a cystic mass.  He then most recently has had similar swelling on the left-hand side.  There is no pain there is no sore throat there is no fever associated.    REVIEW OF SYSTEMS:   GENERAL HEALTH: feels well otherwise  GENERAL : denies fever, chills, sweats, weight loss, weight gain  SKIN: denies any unusual skin lesions or rashes  RESPIRATORY: denies shortness of breath with exertion  NEURO: denies headaches    EXAM:   There were no vitals taken for this visit.    System Findings Details   Constitutional  Overall appearance - Normal.   Psychiatric  Orientation - Oriented to time, place, person & situation. Appropriate mood and affect.   Head/Face  Facial features -- Normal. Skull - Normal.   Eyes  Pupils equal ,round ,react to light and accomidate   Ears, Nose, Throat, Neck  Oral cavity clear oropharynx free of lesions neck reveals no masses on the right however there is soft swelling at level 1 on the left suggestive of ranula formation   Neurological  Memory - Normal. Cranial nerves - Cranial nerves II through XII grossly intact.   Lymph Detail  Submental. Submandibular. Anterior cervical. Posterior cervical. Supraclavicular.       ASSESSMENT AND PLAN:   1. Neck mass  CT scan from the neck last year was reviewed which suggested a cystic structure.  This was on the right.  That appears to have resolved by exam but now he has a similar finding on the left.  He will take antibiotic.  If he does not notice improvement he will undergo a CT scan of the neck and then we will speak after the above.  - CT SOFT TISSUE OF NECK(CONTRAST ONLY) (CPT=70491); Future      The patient indicates understanding of these issues and agrees to the plan.    No follow-ups on file.    Ghanshyam Nuñez,  MD  4/24/2025  6:00 PM

## 2025-05-03 ENCOUNTER — HOSPITAL ENCOUNTER (OUTPATIENT)
Dept: CT IMAGING | Age: 55
Discharge: HOME | End: 2025-05-03
Attending: FAMILY MEDICINE
Payer: COMMERCIAL

## 2025-05-03 ENCOUNTER — HOSPITAL ENCOUNTER (OUTPATIENT)
Dept: CT IMAGING | Age: 55
Discharge: HOME | End: 2025-05-03
Attending: OTOLARYNGOLOGY
Payer: COMMERCIAL

## 2025-05-03 DIAGNOSIS — R22.1 NECK MASS: ICD-10-CM

## 2025-05-03 DIAGNOSIS — R51.9 ACUTE INTRACTABLE HEADACHE, UNSPECIFIED HEADACHE TYPE: ICD-10-CM

## 2025-05-03 PROCEDURE — 70491 CT SOFT TISSUE NECK W/DYE: CPT | Performed by: OTOLARYNGOLOGY

## 2025-05-03 PROCEDURE — 70460 CT HEAD/BRAIN W/DYE: CPT | Performed by: FAMILY MEDICINE

## 2025-05-06 DIAGNOSIS — R22.1 NECK MASS: Primary | ICD-10-CM

## 2025-05-06 NOTE — PROGRESS NOTES
Patient left message.  I have ordered needle biopsy of the left-sided neck mass.  When he had the right sided mass biopsy last year it was therapeutic.  I would like to see him back after biopsy has been performed.

## 2025-05-27 NOTE — DISCHARGE INSTRUCTIONS
Discharge/After Visit [facility] Department of Radiology  Biopsy of Soft Tissue (Left Neck Mass)    Activity  Take it easy for the rest of the day after your biopsy. You may be sore at the site of the biopsy for the next 5 days. Do not do any strenuous exercises or lift over 5 lbs. for 24 hours after the procedure.     Biopsy Site  Keep the bandage on the biopsy site for the next hour. No shower/bathing restrictions.    Pain Management  You may use over-the-counter or prescribed pain relief medication if not contraindicated due to a medical condition.   You may use a covered ice pack to the procedure site for 20 min, as needed, for pain.   The site may be red or tender for a few days.  You may develop a sore throat after the procedure. If necessary, throat lozenges may be used to relieve the discomfort.     Medications  You may resume your usual home medications, including blood thinners (24 hours after the procedure) if you experience no bleeding or hematoma at the puncture site.     When to seek medical advice  Call the provider that ordered the biopsy with questions and to discuss test results. They may also be available on your [facility] My Chart. You may also call the Radiology nurse at [phone], M-F, 8-5 with any additional questions or concerns.    Dial [phone] and ask the  to page the on-call Radiologist right away if any of these occur:  There is a change in color or temperature of the area where the biopsy was performed.  You develop increasing pain, increased swelling in your neck, difficulty breathing or swallowing.    IF YOU FEEL YOU ARE HAVING AN EMERGENCY,   CALL 911 OR GO TO THE NEAREST EMERGENCY ROOM      [phone] MO/  Radiology

## 2025-05-28 ENCOUNTER — APPOINTMENT (OUTPATIENT)
Dept: LAB | Facility: HOSPITAL | Age: 55
End: 2025-05-28
Attending: OTOLARYNGOLOGY
Payer: COMMERCIAL

## 2025-05-28 ENCOUNTER — HOSPITAL ENCOUNTER (OUTPATIENT)
Dept: ULTRASOUND IMAGING | Facility: HOSPITAL | Age: 55
Discharge: HOME | End: 2025-05-28
Attending: OTOLARYNGOLOGY
Payer: COMMERCIAL

## 2025-05-28 DIAGNOSIS — R22.1 NECK MASS: ICD-10-CM

## 2025-05-28 PROCEDURE — 87205 SMEAR GRAM STAIN: CPT | Performed by: OTOLARYNGOLOGY

## 2025-05-28 PROCEDURE — 87075 CULTR BACTERIA EXCEPT BLOOD: CPT | Performed by: OTOLARYNGOLOGY

## 2025-05-28 PROCEDURE — 87070 CULTURE OTHR SPECIMN AEROBIC: CPT | Performed by: OTOLARYNGOLOGY

## 2025-05-28 PROCEDURE — 87102 FUNGUS ISOLATION CULTURE: CPT | Performed by: OTOLARYNGOLOGY

## 2025-05-28 PROCEDURE — 87206 SMEAR FLUORESCENT/ACID STAI: CPT | Performed by: OTOLARYNGOLOGY

## 2025-05-28 PROCEDURE — 88305 TISSUE EXAM BY PATHOLOGIST: CPT | Performed by: OTOLARYNGOLOGY

## 2025-05-28 PROCEDURE — 88173 CYTOPATH EVAL FNA REPORT: CPT | Performed by: OTOLARYNGOLOGY

## 2025-05-28 PROCEDURE — 10005 FNA BX W/US GDN 1ST LES: CPT | Performed by: OTOLARYNGOLOGY

## 2025-05-31 ENCOUNTER — HOSPITAL ENCOUNTER (OUTPATIENT)
Dept: MRI IMAGING | Age: 55
Discharge: HOME | End: 2025-05-31
Attending: FAMILY MEDICINE
Payer: COMMERCIAL

## 2025-05-31 DIAGNOSIS — R51.9 ACUTE INTRACTABLE HEADACHE, UNSPECIFIED HEADACHE TYPE: ICD-10-CM

## 2025-05-31 PROCEDURE — A9575 INJ GADOTERATE MEGLUMI 0.1ML: HCPCS | Performed by: FAMILY MEDICINE

## 2025-05-31 PROCEDURE — 70553 MRI BRAIN STEM W/O & W/DYE: CPT | Performed by: FAMILY MEDICINE

## 2025-05-31 RX ORDER — GADOTERATE MEGLUMINE 376.9 MG/ML
20 INJECTION INTRAVENOUS
Status: COMPLETED | OUTPATIENT
Start: 2025-05-31 | End: 2025-05-31

## 2025-05-31 RX ADMIN — GADOTERATE MEGLUMINE 20 ML: 376.9 INJECTION INTRAVENOUS at 08:12:00

## 2025-06-04 ENCOUNTER — OFFICE VISIT (OUTPATIENT)
Facility: LOCATION | Age: 55
End: 2025-06-04
Payer: COMMERCIAL

## 2025-06-04 ENCOUNTER — PATIENT MESSAGE (OUTPATIENT)
Facility: LOCATION | Age: 55
End: 2025-06-04

## 2025-06-04 DIAGNOSIS — R22.1 NECK MASS: Primary | ICD-10-CM

## 2025-06-04 DIAGNOSIS — L04.0 ABSCESS OF LYMPH NODE OF NECK: ICD-10-CM

## 2025-06-04 PROCEDURE — 99214 OFFICE O/P EST MOD 30 MIN: CPT | Performed by: OTOLARYNGOLOGY

## 2025-06-04 RX ORDER — PREDNISONE 10 MG/1
10 TABLET ORAL DAILY
Qty: 7 TABLET | Refills: 0 | Status: SHIPPED | OUTPATIENT
Start: 2025-06-04

## 2025-06-04 NOTE — TELEPHONE ENCOUNTER
Patient having swelling of the submandibular region after needle biopsy procedure.  I will send in Augmentin and prednisone.

## 2025-06-04 NOTE — PROGRESS NOTES
Colin Helm is a 55 year old male.   Chief Complaint   Patient presents with    Neck Pain     Neck swelling     HPI:   He is status post ReSound guided needle biopsy of left submandibular mass.  Biopsy was consistent with inflammatory cells.  This is last week.  Over the last 24 hours he has had some pain and swelling in the region.    REVIEW OF SYSTEMS:   GENERAL HEALTH: feels well otherwise  GENERAL : denies fever, chills, sweats, weight loss, weight gain  SKIN: denies any unusual skin lesions or rashes  RESPIRATORY: denies shortness of breath with exertion  NEURO: denies headaches    EXAM:   There were no vitals taken for this visit.    System Findings Details   Constitutional  Overall appearance - Normal.   Psychiatric  Orientation - Oriented to time, place, person & situation. Appropriate mood and affect.   Head/Face  Facial features -- Normal. Skull - Normal.   Eyes  Pupils equal ,round ,react to light and accomidate   Ears, Nose, Throat, Neck  Ears clear nose clear oral cavity clear oropharynx clear no swelling in the oral cavity oropharynx neck reveals swelling at the left side of the neck but no erythema.   Neurological  Memory - Normal. Cranial nerves - Cranial nerves II through XII grossly intact.   Lymph Detail  Submental. Submandibular. Anterior cervical. Posterior cervical. Supraclavicular.   After informed consent procedure was performed.  2 cc of 1% lidocaine with epinephrine was injected after cleaning with alcohol.  A 22-gauge needle on a 10 cc syringe was used to remove approximately 2 cc of clear fluid.    ASSESSMENT AND PLAN:   1. Neck mass  Needle biopsy was negative for malignancy.  I am concerned that this represents a sialadenitis possible sublingual issue.    2. Abscess of lymph node of neck  Status post ultrasound-guided needle drainage last week and then I once again performed a drainage in the office.  He will start on Augmentin and prednisone.  I would like him to follow-up with  Dr. Pinto at [place] for further consideration and evaluation.      The patient indicates understanding of these issues and agrees to the plan.    No follow-ups on file.    Ghanshyam Nuñez MD  6/4/2025  12:45 PM

## 2025-06-07 ENCOUNTER — HOSPITAL ENCOUNTER (INPATIENT)
Facility: HOSPITAL | Age: 55
LOS: 1 days | Discharge: HOME | End: 2025-06-09
Attending: EMERGENCY MEDICINE | Admitting: STUDENT IN AN ORGANIZED HEALTH CARE EDUCATION/TRAINING PROGRAM
Payer: COMMERCIAL

## 2025-06-07 DIAGNOSIS — K12.2 SUBMANDIBULAR ABSCESS: Primary | ICD-10-CM

## 2025-06-07 DIAGNOSIS — M27.2 MANDIBULAR ABSCESS: ICD-10-CM

## 2025-06-07 LAB
ANION GAP SERPL CALC-SCNC: 7 MMOL/L (ref 0–18)
BASOPHILS # BLD AUTO: 0.03 X10(3) UL (ref 0–0.2)
BASOPHILS NFR BLD AUTO: 0.4 %
BUN BLD-MCNC: 16 MG/DL (ref 9–23)
CALCIUM BLD-MCNC: 9.4 MG/DL (ref 8.7–10.6)
CHLORIDE SERPL-SCNC: 108 MMOL/L (ref 98–112)
CO2 SERPL-SCNC: 27 MMOL/L (ref 21–32)
CREAT BLD-MCNC: 0.91 MG/DL (ref 0.7–1.3)
EGFRCR SERPLBLD CKD-EPI 2021: 100 ML/MIN/1.73M2 (ref 60–?)
EOSINOPHIL # BLD AUTO: 0.03 X10(3) UL (ref 0–0.7)
EOSINOPHIL NFR BLD AUTO: 0.4 %
ERYTHROCYTE [DISTWIDTH] IN BLOOD BY AUTOMATED COUNT: 12.7 %
GLUCOSE BLD-MCNC: 107 MG/DL (ref 70–99)
HCT VFR BLD AUTO: 41 % (ref 39–53)
HGB BLD-MCNC: 14 G/DL (ref 13–17.5)
IMM GRANULOCYTES # BLD AUTO: 0.01 X10(3) UL (ref 0–1)
IMM GRANULOCYTES NFR BLD: 0.1 %
LYMPHOCYTES # BLD AUTO: 1.25 X10(3) UL (ref 1–4)
LYMPHOCYTES NFR BLD AUTO: 17.2 %
MCH RBC QN AUTO: 30.9 PG (ref 26–34)
MCHC RBC AUTO-ENTMCNC: 34.1 G/DL (ref 31–37)
MCV RBC AUTO: 90.5 FL (ref 80–100)
MONOCYTES # BLD AUTO: 0.66 X10(3) UL (ref 0.1–1)
MONOCYTES NFR BLD AUTO: 9.1 %
NEUTROPHILS # BLD AUTO: 5.3 X10 (3) UL (ref 1.5–7.7)
NEUTROPHILS # BLD AUTO: 5.3 X10(3) UL (ref 1.5–7.7)
NEUTROPHILS NFR BLD AUTO: 72.8 %
OSMOLALITY SERPL CALC.SUM OF ELEC: 296 MOSM/KG (ref 275–295)
PLATELET # BLD AUTO: 294 10(3)UL (ref 150–450)
POTASSIUM SERPL-SCNC: 3.5 MMOL/L (ref 3.5–5.1)
RBC # BLD AUTO: 4.53 X10(6)UL (ref 4.3–5.7)
SODIUM SERPL-SCNC: 142 MMOL/L (ref 136–145)
WBC # BLD AUTO: 7.3 X10(3) UL (ref 4–11)

## 2025-06-08 ENCOUNTER — ANESTHESIA EVENT (OUTPATIENT)
Dept: SURGERY | Facility: HOSPITAL | Age: 55
End: 2025-06-08
Payer: COMMERCIAL

## 2025-06-08 ENCOUNTER — APPOINTMENT (OUTPATIENT)
Dept: CT IMAGING | Age: 55
End: 2025-06-08
Attending: EMERGENCY MEDICINE
Payer: COMMERCIAL

## 2025-06-08 PROBLEM — K12.2 SUBMANDIBULAR ABSCESS: Status: ACTIVE | Noted: 2025-06-08

## 2025-06-08 PROCEDURE — 99223 1ST HOSP IP/OBS HIGH 75: CPT | Performed by: STUDENT IN AN ORGANIZED HEALTH CARE EDUCATION/TRAINING PROGRAM

## 2025-06-08 PROCEDURE — 70491 CT SOFT TISSUE NECK W/DYE: CPT | Performed by: EMERGENCY MEDICINE

## 2025-06-08 RX ORDER — ACETAMINOPHEN 500 MG
500 TABLET ORAL EVERY 4 HOURS PRN
Status: DISCONTINUED | OUTPATIENT
Start: 2025-06-08 | End: 2025-06-09

## 2025-06-08 RX ORDER — BENZONATATE 100 MG/1
200 CAPSULE ORAL 3 TIMES DAILY PRN
Status: DISCONTINUED | OUTPATIENT
Start: 2025-06-08 | End: 2025-06-09

## 2025-06-08 RX ORDER — ONDANSETRON 2 MG/ML
4 INJECTION INTRAMUSCULAR; INTRAVENOUS EVERY 6 HOURS PRN
Status: DISCONTINUED | OUTPATIENT
Start: 2025-06-08 | End: 2025-06-09

## 2025-06-08 RX ORDER — SODIUM CHLORIDE 9 MG/ML
INJECTION, SOLUTION INTRAVENOUS CONTINUOUS
Status: DISCONTINUED | OUTPATIENT
Start: 2025-06-08 | End: 2025-06-09

## 2025-06-08 RX ORDER — ENOXAPARIN SODIUM 100 MG/ML
40 INJECTION SUBCUTANEOUS NIGHTLY
Status: DISCONTINUED | OUTPATIENT
Start: 2025-06-08 | End: 2025-06-09

## 2025-06-08 RX ORDER — SENNOSIDES 8.6 MG
17.2 TABLET ORAL NIGHTLY PRN
Status: DISCONTINUED | OUTPATIENT
Start: 2025-06-08 | End: 2025-06-09

## 2025-06-08 RX ORDER — BISACODYL 10 MG
10 SUPPOSITORY, RECTAL RECTAL
Status: DISCONTINUED | OUTPATIENT
Start: 2025-06-08 | End: 2025-06-09

## 2025-06-08 RX ORDER — PREDNISONE 10 MG/1
10 TABLET ORAL DAILY
Status: DISCONTINUED | OUTPATIENT
Start: 2025-06-08 | End: 2025-06-09

## 2025-06-08 RX ORDER — ECHINACEA PURPUREA EXTRACT 125 MG
1 TABLET ORAL
Status: DISCONTINUED | OUTPATIENT
Start: 2025-06-08 | End: 2025-06-09

## 2025-06-08 RX ORDER — POLYETHYLENE GLYCOL 3350 17 G/17G
17 POWDER, FOR SOLUTION ORAL DAILY PRN
Status: DISCONTINUED | OUTPATIENT
Start: 2025-06-08 | End: 2025-06-09

## 2025-06-08 RX ORDER — LOSARTAN POTASSIUM 50 MG/1
50 TABLET ORAL DAILY
Status: DISCONTINUED | OUTPATIENT
Start: 2025-06-08 | End: 2025-06-09

## 2025-06-08 RX ORDER — SODIUM PHOSPHATE, DIBASIC AND SODIUM PHOSPHATE, MONOBASIC 7; 19 G/230ML; G/230ML
1 ENEMA RECTAL ONCE AS NEEDED
Status: DISCONTINUED | OUTPATIENT
Start: 2025-06-08 | End: 2025-06-09

## 2025-06-08 RX ORDER — PROCHLORPERAZINE EDISYLATE 5 MG/ML
5 INJECTION INTRAMUSCULAR; INTRAVENOUS EVERY 8 HOURS PRN
Status: DISCONTINUED | OUTPATIENT
Start: 2025-06-08 | End: 2025-06-09

## 2025-06-08 NOTE — PLAN OF CARE
Patient alert and oriented x 4. On RA. Up ad jana. Continent of bowel/bladder. No complaints of pain, shortness of breath, chest pain/discomfort. POC: plan for abscess drainage tomorrow in OR. Per pt reports no pain on L side jaw and feels it's not as swollen. Call light within reach. Fall precautions in place. Patient updated on POC, all questions answered at this time.    1050: This RN called Dr. Gutierrez to talk to patient and answer any questions.   Problem: Patient/Family Goals  Goal: Patient/Family Long Term Goal  Description: Patient's Long Term Goal: to go home    Interventions:  - ENT consult  - See additional Care Plan goals for specific interventions  Outcome: Progressing  Goal: Patient/Family Short Term Goal  Description: Patient's Short Term Goal: to feel better    Interventions:   - pain control  -IV abx  - See additional Care Plan goals for specific interventions  Outcome: Progressing

## 2025-06-08 NOTE — ED PROVIDER NOTES
Patient Seen in: [facility] [place]        History  Chief Complaint   Patient presents with    Swelling Edema     Stated Complaint: facial swelling    Subjective:   HPI            This is a 55-year-old male presents emergency room for evaluation of neck swelling.  Patient reports he has been under care of ENT for neck swelling, on review of medical records patient recently had ultrasound-guided needle biopsy of left-sided submandibular mass, biopsy was consistent with inflammatory cells.  Patient did follow-up with ENT on 6/4/2025, it was noted biopsy was negative for malignancy.  ENT was concerned may represent sialoadenitis possible sublingual issue.  He was placed on Augmentin and prednisone.  Patient states the last 24 hours the areas become more swollen and tender, denies difficulty speaking or swallowing, denies any fevers or chills.  Denies chest pain or shortness of breath.  Denies difficulty opening or closing the mouth.      Objective:     Past Medical History:    Acute deep vein thrombosis (DVT) of femoral vein of left lower extremity (HCC)    Acute pulmonary embolism (HCC)    ADD (attention deficit disorder)    Anxiety    Calculus of kidney    COVID    inpt for 14 days    Depression    Essential hypertension    PE (pulmonary thromboembolism) (HCC)    w covid     Sleep apnea              Past Surgical History:   Procedure Laterality Date    Colonoscopy  2009    Hernia surgery  2010    umbilical mesh    Repair ing hernia,6+y/o,reducibl      Skin surgery  2017    skin cancer removed    Vasectomy  2006                Social History     Socioeconomic History    Marital status:    Tobacco Use    Smoking status: Never    Smokeless tobacco: Never   Vaping Use    Vaping status: Never Used   Substance and Sexual Activity    Alcohol use: Yes     Alcohol/week: 1.0 standard drink of alcohol     Types: 1 Glasses of wine per week     Comment:  socially     Drug use: Never   Other Topics  Concern    Caffeine Concern No    Exercise No    Seat Belt No    Special Diet No    Stress Concern No    Weight Concern No     Social Drivers of Health     Transportation Needs: No Transportation Needs (8/30/2021)    Received from [facility]     Lack of Transportation (Medical): No     Lack of Transportation (Non-Medical): No                                Physical Exam    ED Triage Vitals [06/07/25 2322]   BP (!) 140/91   Pulse 62   Resp 16   Temp 98.4 °F (36.9 °C)   Temp src Temporal   SpO2 94 %   O2 Device None (Room air)       Current Vitals:   Vital Signs  BP: 133/77  Pulse: 56  Resp: 18  Temp: 98.4 °F (36.9 °C)  Temp src: Temporal    Oxygen Therapy  SpO2: 96 %  O2 Device: None (Room air)            Physical Exam  GENERAL: Patient is awake, alert, well-appearing, in no acute distress.  HEENT: no scleral icterus.  Mucous membranes are moist, oropharynx is clear, uvula midline.  Sublingual space is soft.  No trismus.    NECK: Neck is supple, there is no nuchal rigidity.  Swelling to the left submandibular region with slight tenderness no erythema or warmth, no fluctuance, trachea midline.  No cervical lymphadenopathy.  HEART: Regular rate and rhythm, no murmurs.  LUNGS: Clear to auscultation bilaterally.  No Rales, no rhonchi, no wheezing, no stridor.  NEUROLOGIC EXAM: Tongue midline, no facial drooping, no ptosis,      ED Course  Labs Reviewed   BASIC METABOLIC PANEL (8) - Abnormal; Notable for the following components:       Result Value    Glucose 107 (*)     Calculated Osmolality 296 (*)     All other components within normal limits   CBC WITH DIFFERENTIAL WITH PLATELET          CT neck soft tissues with contrast      IMPRESSION:    No priors.    There is a rim-enhancing collection compatible with abscess located in the left submandibular region, with slight mass effect on the floor of mouth muscles.  The collection measures about 5.6 x 3.4 cm in greatest transaxial  dimensions and about 2.5 cm in the craniocaudal dimension.  There is a second likely communicating smaller collection extending inferiorly, measuring 1.3 cm in diameter.    There is subcutaneous fat stranding/infiltration in the adjacent left neck along the strap muscles.    The left submandibular gland is slightly hypodense and displaced inferiorly, likely representing sialoadenitis.  No sialolith identified.    The remainder of the cervical spaces are within normal limits.  The airway is patent.    Patchy groundglass opacities are noted in the lung apices in a nonspecific pattern.                  MDM       Differential diagnosis before testing includes but not limited to submandibular abscess, cyst, mass, which is a medical condition that poses a threat to life/function    Radiographic images  I personally reviewed the radiographs and my individual interpretation shows CT reviewed, sublingual swelling/abscess noted  I also reviewed the official reports that showed There is a rim-enhancing collection compatible with abscess located in the left submandibular region, with slight mass effect on the floor of mouth muscles.  The collection measures about 5.6 x 3.4 cm in greatest transaxial dimensions and about 2.5 cm in the craniocaudal dimension.  There is a second likely communicating smaller collection extending inferiorly, measuring 1.3 cm in diameter.    There is subcutaneous fat stranding/infiltration in the adjacent left neck along the strap muscles.    The left submandibular gland is slightly hypodense and displaced inferiorly, likely representing sialoadenitis.  No sialolith identified.    The remainder of the cervical spaces are within normal limits.  The airway is patent.    Patchy groundglass opacities are noted in the lung apices in a nonspecific pattern.      Discussion of management (consult/physicians, social work, pharmacy,ect) [facility]    External chart review  included ENT notes reviewed as in HPI    Medications Provided: IV Zosyn    Course of Events during Emergency Room Visit include IV established, blood work obtained.  Medical records reviewed.  CBC and chemistry unremarkable, CT performed which did reveal rim-enhancing lesion concerning for abscess, I discussed with ENT who recommended admission and IV antibiotics.  I discussed this with the patient he agrees with plan.  Patient be transferred to [facility] for admission, discussed with hospitalist physician.  Vital signs stable.    Shared decision making was utilized           Disposition:    Admission  I have discussed with the patient the results of test, differential diagnosis, and treatment plan. They expressed clear understanding of these instructions and agrees to the plan provided.       Note to patient: The 21st Century Cures Act makes medical notes like these available to patients in the interest of transparency. However, this is a medical document intended as peer to peer communication. It is written in medical language and may contain abbreviations or verbiage that are unfamiliar. It may appear blunt or direct. Medical documents are intended to carry relevant information, facts as evident, and the clinical opinion of the practitioner.             Admission disposition: 6/8/2025  1:37 AM           Medical Decision Making      Disposition and Plan     Clinical Impression:  1. Submandibular abscess         Disposition:  Admit  6/8/2025  1:37 am    Follow-up:  No follow-up provider specified.        Medications Prescribed:  Current Discharge Medication List                Supplementary Documentation:         Hospital Problems       Present on Admission  Date Reviewed: 6/4/2025          ICD-10-CM Noted POA    * (Principal) Submandibular abscess K12.2 6/8/2025 Unknown

## 2025-06-08 NOTE — PLAN OF CARE
Patient arrived to the unit via wheelchair.    A&Ox4. VSS. RA. .  GI: Abdomen soft. Last BM was 6/7/25.  : Up to the restroom for voids.  He reports his pain as moderate to the left jaw and neck.  Up independently.   Diet: NPO until evaluated by ENT.  IVF running per order. IV abx per order.     Skin assessment completed with Erm, PCT. Patient noted to have a quarter sized scab below the left jaw it is dry and brown, no drainage noted. He does have significant swelling to the left side of his neck and jaw with some swelling noted under his left eye, otherwise his skin is intact.    All appropriate safety measures in place. All questions and concerns addressed to the best of my ability

## 2025-06-08 NOTE — H&P
[facility]     Colin Helm Patient Status:  Inpatient    1970 MRN [id number]   Location [facility] 3NW-A Attending Nicolle Rajan, DO   Hosp Day # 0 PCP George Grayson MD     Chief Complaint: enlarging mass left jaw.neck    Subjective:    History of Present Illness:     Colin Helm is a 55 year old male with PMHx FORTINO/ anxiety/ HTN/ PE who presented to the hospital for swelling under his left jaw. He saw his ENT on  for follow up after a left submandibular mass needle biopsy which showed inflammatory cells. He was diagnosed with an abscess and it was drained in the office. He was placed on Augmentin and prednisone. Since going home, the swelling got worse and started to spread to his ear along his jawline. He had a pressure sensation and discomfort in his jaw which felt like he was chewing on meat for a week long. He denied any fever, chills, swallowing problems.    History/Other:    Past Medical History:  Past Medical History[1]  Past Surgical History:   Past Surgical History[2]   Family History:   Family History[3]  Social History:    reports that he has never smoked. He has never used smokeless tobacco. He reports current alcohol use of about 1.0 standard drink of alcohol per week. He reports that he does not use drugs.     Allergies: Allergies[4]    Medications:  Medications Ordered Prior to Encounter[5]    Review of Systems:   A comprehensive review of systems was completed.    Pertinent positives and negatives noted in the HPI.    Objective:   Physical Exam:    /86 (BP Location: Left arm)   Pulse 52   Temp 97.9 °F (36.6 °C) (Oral)   Resp 18   Wt 212 lb 1.3 oz (96.2 kg)   SpO2 100%   BMI 28.76 kg/m²   General: No acute distress, Alert  Respiratory: No rhonchi, no wheezes  Cardiovascular: S1, S2. Regular rate and rhythm  Abdomen: Soft, Non-tender, non-distended, positive bowel sounds  Neuro: No new focal deficits  Extremities: No  edema  HEENT: palpable mass under left jaw more prominent medially with extension along mandible towards ear    Results:    Labs:      Labs Last 24 Hours:    Recent Labs   Lab 06/07/25  2339   RBC 4.53   HGB 14.0   HCT 41.0   MCV 90.5   MCH 30.9   MCHC 34.1   RDW 12.7   NEPRELIM 5.30   WBC 7.3   .0       Recent Labs   Lab 06/07/25  2339   *   BUN 16   CREATSERUM 0.91   EGFRCR 100   CA 9.4      K 3.5      CO2 27.0       Estimated Glomerular Filtration Rate: 100 mL/min/1.73m2 (result from lab).    Lab Results   Component Value Date    INR 1.15 (H) 08/24/2021       No results for input(s): \"TROP\", \"TROPHS\", \"CK\" in the last 168 hours.    No results for input(s): \"TROP\", \"PBNP\" in the last 168 hours.    No results for input(s): \"PCT\" in the last 168 hours.    Imaging: Imaging data reviewed in Epic.    Assessment & Plan:      #Submandibular abscess  -CT showing rim enhancing abscess in left submandibular region with slight mass effect on floor of mouth muscles 56 x3.4 cm, second likely communicating collection extending inferiorly 1.3 cm, sub-Q fat stranding along strap muscles, g-g opacities in lung apices  -met 0 SIRS criteria  -failed outpt Augmentin and decadron  -antibiotics: zosyn  -tylenol prn  -NPO  -cont prednisone  -ENT c/s in ED    #HTN  -cont home losartan      Plan of care discussed with ED physician    Nicolle Rajan DO    Supplementary Documentation:     The 21st Century Cures Act makes medical notes like these available to patients in the interest of transparency. Please be advised this is a medical document. Medical documents are intended to carry relevant information, facts as evident, and the clinical opinion of the practitioner. The medical note is intended as peer to peer communication and may appear blunt or direct. It is written in medical language and may contain abbreviations or verbiage that are unfamiliar.                                       [1]   Past Medical  History:   Acute deep vein thrombosis (DVT) of femoral vein of left lower extremity (HCC)    Acute pulmonary embolism (HCC)    ADD (attention deficit disorder)    Anxiety    Calculus of kidney    COVID    inpt for 14 days    Depression    Essential hypertension    PE (pulmonary thromboembolism) (HCC)    w covid     Sleep apnea   [2]   Past Surgical History:  Procedure Laterality Date    Colonoscopy  2009    Hernia surgery  2010    umbilical mesh    Repair ing hernia,5+y/o,reducibl      Skin surgery  2017    skin cancer removed    Vasectomy  2006   [3]   Family History  Problem Relation Age of Onset    Cancer Mother    [4] No Known Allergies  [5]   Current Facility-Administered Medications on File Prior to Encounter   Medication Dose Route Frequency Provider Last Rate Last Admin    [COMPLETED] gadoterate meglumine (Dotarem) 10 MMOL/20ML injection 20 mL  20 mL Intravenous ONCE PRN George Grayson MD   20 mL at 05/31/25 0812    [COMPLETED] iopamidol 76% (ISOVUE-370) injection for power injector  25 mL Intravenous ONCE PRN George Graysno MD   25 mL at 05/03/25 1230    [COMPLETED] iopamidol 76% (ISOVUE-370) injection for power injector  50 mL Intravenous ONCE PRN Ghanshyam Nuñez MD   50 mL at 05/03/25 1225     Current Outpatient Medications on File Prior to Encounter   Medication Sig Dispense Refill    amoxicillin clavulanate 875-125 MG Oral Tab Take 1 tablet by mouth every 12 (twelve) hours. 20 tablet 0    predniSONE 10 MG Oral Tab Take 1 tablet (10 mg total) by mouth daily. 7 tablet 0    amoxicillin clavulanate 875-125 MG Oral Tab Take 1 tablet by mouth every 12 (twelve) hours. 20 tablet 0    MIEBO 1.338 GM/ML Ophthalmic Solution Apply 1 drop to eye every 6 (six) hours.      losartan 50 MG Oral Tab Take 1 tablet (50 mg total) by mouth daily. 90 tablet 1    PEG 3350-KCl-Na Bicarb-NaCl (TRILYTE) 420 g Oral Recon Soln Starting at 4:00 pm the night before procedure, drink 8 ounces of the prep every 15-20  minutes until finished (Patient not taking: Reported on 4/21/2025) 1 each 0

## 2025-06-08 NOTE — ANESTHESIA PREPROCEDURE EVALUATION
PRE-OP EVALUATION    Patient Name: Colin Helm    Admit Diagnosis: Submandibular abscess [K12.2]    Pre-op Diagnosis: Mandibular abscess [M27.2]    INCISION AND DRAINAGE SUB-MANDIBULAR ABSCESS    Anesthesia Procedure: INCISION AND DRAINAGE SUB-MANDIBULAR ABSCESS (Left)    Surgeons and Role:     * Yousuf Gutierrez MD - Primary    Pre-op vitals reviewed.  Temp: 97.9 °F (36.6 °C)  Pulse: 50  Resp: 18  BP: 119/80  SpO2: 98 %  Body mass index is 28.76 kg/m².    Current medications reviewed.  Hospital Medications:  Current Medications[1]    Outpatient Medications:   Prescriptions Prior to Admission[2]    Allergies: Patient has no known allergies.      Anesthesia Evaluation    Patient summary reviewed.    Anesthetic Complications  (-) history of anesthetic complications         GI/Hepatic/Renal    Negative GI/hepatic/renal ROS.                             Cardiovascular      ECG reviewed.  Exercise tolerance: good     MET: >4      (+) hypertension                                     Endo/Other    Negative endo/other ROS.                              Pulmonary                    (+) sleep apnea       Neuro/Psych      (+) depression                                Past Surgical History[3]  Social Hx on file[4]  History   Drug Use Unknown     Available pre-op labs reviewed.  Lab Results   Component Value Date    WBC 7.3 06/07/2025    RBC 4.53 06/07/2025    HGB 14.0 06/07/2025    HCT 41.0 06/07/2025    MCV 90.5 06/07/2025    MCH 30.9 06/07/2025    MCHC 34.1 06/07/2025    RDW 12.7 06/07/2025    .0 06/07/2025     Lab Results   Component Value Date     06/07/2025    K 3.5 06/07/2025     06/07/2025    CO2 27.0 06/07/2025    BUN 16 06/07/2025    CREATSERUM 0.91 06/07/2025     (H) 06/07/2025    CA 9.4 06/07/2025            Airway      Mallampati: unable to assess      Neck ROM: full Cardiovascular    Cardiovascular exam normal.  Rhythm: regular  Rate: normal  (-) murmur   Dental    Dentition appears  grossly intact         Pulmonary    Pulmonary exam normal.  Breath sounds clear to auscultation bilaterally.        (-) wheezes       Other findings              ASA: 2   Plan: general  NPO status verified and patient meets guidelines.          Plan/risks discussed with: patient              We discussed GA w/ETT and possible scratchy throat and rarely dental damage.  We discussed analgesic plan and PONV prophylaxis.  The patient's questions were answered and consent was attained.          [1]    [COMPLETED] iopamidol 76% (ISOVUE-370) injection for power injector  50 mL Intravenous ONCE PRN    [COMPLETED] piperacillin-tazobactam (Zosyn) 4.5 g in dextrose 5% 100 mL IVPB-ADDV  4.5 g Intravenous Once    losartan (Cozaar) tab 50 mg  50 mg Oral Daily    [Held by provider] Perfluorohexyloctane SOLN 1 drop  1 drop Ophthalmic Q6H    predniSONE (Deltasone) tab 10 mg  10 mg Oral Daily    piperacillin-tazobactam (Zosyn) 3.375 g in dextrose 5% 100 mL IVPB-ADDV  3.375 g Intravenous Q8H    sodium chloride 0.9% infusion   Intravenous Continuous    enoxaparin (Lovenox) 40 MG/0.4ML SUBQ injection 40 mg  40 mg Subcutaneous Nightly    acetaminophen (Tylenol Extra Strength) tab 500 mg  500 mg Oral Q4H PRN    melatonin tab 3 mg  3 mg Oral Nightly PRN    polyethylene glycol (PEG 3350) (Miralax) 17 g oral packet 17 g  17 g Oral Daily PRN    sennosides (Senokot) tab 17.2 mg  17.2 mg Oral Nightly PRN    bisacodyl (Dulcolax) 10 MG rectal suppository 10 mg  10 mg Rectal Daily PRN    fleet enema (Fleet) rectal enema 133 mL  1 enema Rectal Once PRN    ondansetron (Zofran) 4 MG/2ML injection 4 mg  4 mg Intravenous Q6H PRN    prochlorperazine (Compazine) 10 MG/2ML injection 5 mg  5 mg Intravenous Q8H PRN    benzonatate (Tessalon) cap 200 mg  200 mg Oral TID PRN    glycerin-hypromellose- (Artificial Tears) 0.2-0.2-1 % ophthalmic solution 1 drop  1 drop Both Eyes QID PRN    sodium chloride (Saline Mist) 0.65 % nasal solution 1 spray  1 spray  Each Nare Q3H PRN   [2]   Medications Prior to Admission   Medication Sig Dispense Refill Last Dose/Taking    MIEBO 1.338 GM/ML Ophthalmic Solution Apply 1 drop to eye every 6 (six) hours.   6/7/2025    losartan 50 MG Oral Tab Take 1 tablet (50 mg total) by mouth daily. 90 tablet 1 Past Week    amoxicillin clavulanate 875-125 MG Oral Tab Take 1 tablet by mouth every 12 (twelve) hours. 20 tablet 0     predniSONE 10 MG Oral Tab Take 1 tablet (10 mg total) by mouth daily. 7 tablet 0     amoxicillin clavulanate 875-125 MG Oral Tab Take 1 tablet by mouth every 12 (twelve) hours. 20 tablet 0     PEG 3350-KCl-Na Bicarb-NaCl (TRILYTE) 420 g Oral Recon Soln Starting at 4:00 pm the night before procedure, drink 8 ounces of the prep every 15-20 minutes until finished (Patient not taking: Reported on 4/21/2025) 1 each 0    [3]   Past Surgical History:  Procedure Laterality Date    Colonoscopy  2009    Hernia surgery  2010    umbilical mesh    Repair ing hernia,5+y/o,reducibl      Skin surgery  2017    skin cancer removed    Vasectomy  2006   [4]   Social History  Socioeconomic History    Marital status:    Tobacco Use    Smoking status: Never    Smokeless tobacco: Never   Vaping Use    Vaping status: Never Used   Substance and Sexual Activity    Alcohol use: Yes     Alcohol/week: 1.0 standard drink of alcohol     Types: 1 Glasses of wine per week     Comment:  socially     Drug use: Never   Other Topics Concern    Caffeine Concern No    Exercise No    Seat Belt No    Special Diet No    Stress Concern No    Weight Concern No

## 2025-06-08 NOTE — ED QUICK NOTES
Report given to Cathy MATTHEW . Pt will be given a Direct Admit form and instructed to go directly to the hospital. Pt verbalized understanding that he is NPO.

## 2025-06-08 NOTE — ED QUICK NOTES
Pt is agreeable to hospital admission. Staff recommended ambulance transport to hospital. Pt prefers to drive himself. Dr Coyne notified.

## 2025-06-09 ENCOUNTER — ANESTHESIA (OUTPATIENT)
Dept: SURGERY | Facility: HOSPITAL | Age: 55
End: 2025-06-09
Payer: COMMERCIAL

## 2025-06-09 VITALS
HEART RATE: 59 BPM | BODY MASS INDEX: 28.72 KG/M2 | HEIGHT: 72 IN | DIASTOLIC BLOOD PRESSURE: 60 MMHG | TEMPERATURE: 98 F | OXYGEN SATURATION: 96 % | WEIGHT: 212.06 LBS | RESPIRATION RATE: 18 BRPM | SYSTOLIC BLOOD PRESSURE: 125 MMHG

## 2025-06-09 LAB
ANION GAP SERPL CALC-SCNC: 7 MMOL/L (ref 0–18)
BASOPHILS # BLD AUTO: 0.03 X10(3) UL (ref 0–0.2)
BASOPHILS NFR BLD AUTO: 0.4 %
BUN BLD-MCNC: 11 MG/DL (ref 9–23)
CALCIUM BLD-MCNC: 9.4 MG/DL (ref 8.7–10.6)
CHLORIDE SERPL-SCNC: 109 MMOL/L (ref 98–112)
CO2 SERPL-SCNC: 29 MMOL/L (ref 21–32)
CREAT BLD-MCNC: 0.95 MG/DL (ref 0.7–1.3)
EGFRCR SERPLBLD CKD-EPI 2021: 95 ML/MIN/1.73M2 (ref 60–?)
EOSINOPHIL # BLD AUTO: 0.04 X10(3) UL (ref 0–0.7)
EOSINOPHIL NFR BLD AUTO: 0.6 %
ERYTHROCYTE [DISTWIDTH] IN BLOOD BY AUTOMATED COUNT: 12.6 %
GLUCOSE BLD-MCNC: 103 MG/DL (ref 70–99)
HCT VFR BLD AUTO: 38.7 % (ref 39–53)
HGB BLD-MCNC: 13 G/DL (ref 13–17.5)
IMM GRANULOCYTES # BLD AUTO: 0.02 X10(3) UL (ref 0–1)
IMM GRANULOCYTES NFR BLD: 0.3 %
INR BLD: 1.11 (ref 0.8–1.2)
LYMPHOCYTES # BLD AUTO: 1.82 X10(3) UL (ref 1–4)
LYMPHOCYTES NFR BLD AUTO: 25.8 %
MCH RBC QN AUTO: 30.4 PG (ref 26–34)
MCHC RBC AUTO-ENTMCNC: 33.6 G/DL (ref 31–37)
MCV RBC AUTO: 90.6 FL (ref 80–100)
MONOCYTES # BLD AUTO: 0.58 X10(3) UL (ref 0.1–1)
MONOCYTES NFR BLD AUTO: 8.2 %
NEUTROPHILS # BLD AUTO: 4.57 X10 (3) UL (ref 1.5–7.7)
NEUTROPHILS # BLD AUTO: 4.57 X10(3) UL (ref 1.5–7.7)
NEUTROPHILS NFR BLD AUTO: 64.7 %
OSMOLALITY SERPL CALC.SUM OF ELEC: 300 MOSM/KG (ref 275–295)
PLATELET # BLD AUTO: 257 10(3)UL (ref 150–450)
POTASSIUM SERPL-SCNC: 3.9 MMOL/L (ref 3.5–5.1)
PROTHROMBIN TIME: 14.4 SECONDS (ref 11.6–14.8)
RBC # BLD AUTO: 4.27 X10(6)UL (ref 4.3–5.7)
SODIUM SERPL-SCNC: 145 MMOL/L (ref 136–145)
WBC # BLD AUTO: 7.1 X10(3) UL (ref 4–11)

## 2025-06-09 PROCEDURE — 99232 SBSQ HOSP IP/OBS MODERATE 35: CPT | Performed by: HOSPITALIST

## 2025-06-09 PROCEDURE — 99232 SBSQ HOSP IP/OBS MODERATE 35: CPT | Performed by: OTOLARYNGOLOGY

## 2025-06-09 PROCEDURE — 41017 DRAINAGE OF MOUTH LESION: CPT | Performed by: OTOLARYNGOLOGY

## 2025-06-09 PROCEDURE — 0J910ZZ DRAINAGE OF FACE SUBCUTANEOUS TISSUE AND FASCIA, OPEN APPROACH: ICD-10-PCS | Performed by: OTOLARYNGOLOGY

## 2025-06-09 RX ORDER — LIDOCAINE HYDROCHLORIDE AND EPINEPHRINE 10; 10 MG/ML; UG/ML
INJECTION, SOLUTION INFILTRATION; PERINEURAL AS NEEDED
Status: DISCONTINUED | OUTPATIENT
Start: 2025-06-09 | End: 2025-06-09 | Stop reason: HOSPADM

## 2025-06-09 RX ORDER — SODIUM CHLORIDE 9 MG/ML
INJECTION, SOLUTION INTRAVENOUS CONTINUOUS
Status: DISCONTINUED | OUTPATIENT
Start: 2025-06-09 | End: 2025-06-09

## 2025-06-09 RX ORDER — HYDROMORPHONE HYDROCHLORIDE 1 MG/ML
INJECTION, SOLUTION INTRAMUSCULAR; INTRAVENOUS; SUBCUTANEOUS
Status: COMPLETED
Start: 2025-06-09 | End: 2025-06-09

## 2025-06-09 RX ORDER — PROCHLORPERAZINE EDISYLATE 5 MG/ML
5 INJECTION INTRAMUSCULAR; INTRAVENOUS EVERY 8 HOURS PRN
Status: DISCONTINUED | OUTPATIENT
Start: 2025-06-09 | End: 2025-06-09 | Stop reason: HOSPADM

## 2025-06-09 RX ORDER — HYDROMORPHONE HYDROCHLORIDE 1 MG/ML
0.4 INJECTION, SOLUTION INTRAMUSCULAR; INTRAVENOUS; SUBCUTANEOUS EVERY 5 MIN PRN
Status: DISCONTINUED | OUTPATIENT
Start: 2025-06-09 | End: 2025-06-09 | Stop reason: HOSPADM

## 2025-06-09 RX ORDER — KETOROLAC TROMETHAMINE 30 MG/ML
INJECTION, SOLUTION INTRAMUSCULAR; INTRAVENOUS AS NEEDED
Status: DISCONTINUED | OUTPATIENT
Start: 2025-06-09 | End: 2025-06-09 | Stop reason: SURG

## 2025-06-09 RX ORDER — ONDANSETRON 2 MG/ML
4 INJECTION INTRAMUSCULAR; INTRAVENOUS EVERY 6 HOURS PRN
Status: DISCONTINUED | OUTPATIENT
Start: 2025-06-09 | End: 2025-06-09 | Stop reason: HOSPADM

## 2025-06-09 RX ORDER — SODIUM CHLORIDE, SODIUM LACTATE, POTASSIUM CHLORIDE, CALCIUM CHLORIDE 600; 310; 30; 20 MG/100ML; MG/100ML; MG/100ML; MG/100ML
INJECTION, SOLUTION INTRAVENOUS CONTINUOUS
Status: DISCONTINUED | OUTPATIENT
Start: 2025-06-09 | End: 2025-06-09 | Stop reason: HOSPADM

## 2025-06-09 RX ORDER — ACETAMINOPHEN 500 MG
1000 TABLET ORAL EVERY 6 HOURS PRN
Status: DISCONTINUED | OUTPATIENT
Start: 2025-06-09 | End: 2025-06-09

## 2025-06-09 RX ORDER — HYDROMORPHONE HYDROCHLORIDE 1 MG/ML
0.6 INJECTION, SOLUTION INTRAMUSCULAR; INTRAVENOUS; SUBCUTANEOUS EVERY 5 MIN PRN
Status: DISCONTINUED | OUTPATIENT
Start: 2025-06-09 | End: 2025-06-09 | Stop reason: HOSPADM

## 2025-06-09 RX ORDER — NALOXONE HYDROCHLORIDE 0.4 MG/ML
80 INJECTION, SOLUTION INTRAMUSCULAR; INTRAVENOUS; SUBCUTANEOUS AS NEEDED
Status: DISCONTINUED | OUTPATIENT
Start: 2025-06-09 | End: 2025-06-09 | Stop reason: HOSPADM

## 2025-06-09 RX ORDER — DIPHENHYDRAMINE HYDROCHLORIDE 50 MG/ML
12.5 INJECTION, SOLUTION INTRAMUSCULAR; INTRAVENOUS AS NEEDED
Status: DISCONTINUED | OUTPATIENT
Start: 2025-06-09 | End: 2025-06-09 | Stop reason: HOSPADM

## 2025-06-09 RX ORDER — PREDNISONE 10 MG/1
10 TABLET ORAL DAILY
Status: DISCONTINUED | OUTPATIENT
Start: 2025-06-10 | End: 2025-06-09

## 2025-06-09 RX ORDER — GLYCOPYRROLATE 0.2 MG/ML
INJECTION, SOLUTION INTRAMUSCULAR; INTRAVENOUS AS NEEDED
Status: DISCONTINUED | OUTPATIENT
Start: 2025-06-09 | End: 2025-06-09 | Stop reason: SURG

## 2025-06-09 RX ORDER — LABETALOL HYDROCHLORIDE 5 MG/ML
5 INJECTION, SOLUTION INTRAVENOUS EVERY 5 MIN PRN
Status: DISCONTINUED | OUTPATIENT
Start: 2025-06-09 | End: 2025-06-09 | Stop reason: HOSPADM

## 2025-06-09 RX ORDER — METOCLOPRAMIDE HYDROCHLORIDE 5 MG/ML
INJECTION INTRAMUSCULAR; INTRAVENOUS
Status: COMPLETED
Start: 2025-06-09 | End: 2025-06-09

## 2025-06-09 RX ORDER — METOCLOPRAMIDE HYDROCHLORIDE 5 MG/ML
10 INJECTION INTRAMUSCULAR; INTRAVENOUS ONCE
Status: COMPLETED | OUTPATIENT
Start: 2025-06-09 | End: 2025-06-09

## 2025-06-09 RX ORDER — ROCURONIUM BROMIDE 10 MG/ML
INJECTION, SOLUTION INTRAVENOUS AS NEEDED
Status: DISCONTINUED | OUTPATIENT
Start: 2025-06-09 | End: 2025-06-09 | Stop reason: SURG

## 2025-06-09 RX ORDER — ACETAMINOPHEN 500 MG
500 TABLET ORAL EVERY 6 HOURS PRN
Status: DISCONTINUED | OUTPATIENT
Start: 2025-06-09 | End: 2025-06-09

## 2025-06-09 RX ORDER — ONDANSETRON 2 MG/ML
INJECTION INTRAMUSCULAR; INTRAVENOUS AS NEEDED
Status: DISCONTINUED | OUTPATIENT
Start: 2025-06-09 | End: 2025-06-09 | Stop reason: SURG

## 2025-06-09 RX ORDER — NEOSTIGMINE METHYLSULFATE 1 MG/ML
INJECTION INTRAVENOUS AS NEEDED
Status: DISCONTINUED | OUTPATIENT
Start: 2025-06-09 | End: 2025-06-09 | Stop reason: SURG

## 2025-06-09 RX ORDER — HYDROMORPHONE HYDROCHLORIDE 1 MG/ML
0.2 INJECTION, SOLUTION INTRAMUSCULAR; INTRAVENOUS; SUBCUTANEOUS EVERY 5 MIN PRN
Status: DISCONTINUED | OUTPATIENT
Start: 2025-06-09 | End: 2025-06-09 | Stop reason: HOSPADM

## 2025-06-09 RX ORDER — MEPERIDINE HYDROCHLORIDE 25 MG/ML
12.5 INJECTION INTRAMUSCULAR; INTRAVENOUS; SUBCUTANEOUS AS NEEDED
Status: DISCONTINUED | OUTPATIENT
Start: 2025-06-09 | End: 2025-06-09 | Stop reason: HOSPADM

## 2025-06-09 RX ADMIN — SODIUM CHLORIDE: 9 INJECTION, SOLUTION INTRAVENOUS at 11:24:00

## 2025-06-09 RX ADMIN — NEOSTIGMINE METHYLSULFATE 3 MG: 1 INJECTION INTRAVENOUS at 11:50:00

## 2025-06-09 RX ADMIN — ONDANSETRON 4 MG: 2 INJECTION INTRAMUSCULAR; INTRAVENOUS at 11:26:00

## 2025-06-09 RX ADMIN — ROCURONIUM BROMIDE 10 MG: 10 INJECTION, SOLUTION INTRAVENOUS at 11:30:00

## 2025-06-09 RX ADMIN — GLYCOPYRROLATE 0.4 MG: 0.2 INJECTION, SOLUTION INTRAMUSCULAR; INTRAVENOUS at 11:50:00

## 2025-06-09 RX ADMIN — KETOROLAC TROMETHAMINE 30 MG: 30 INJECTION, SOLUTION INTRAMUSCULAR; INTRAVENOUS at 11:50:00

## 2025-06-09 NOTE — CONSULTS
Otolaryngology Consultation Note     Reason for consultation: neck abscess  Consulting physician and service: Nicolle Mcrae DO     HPI: 56 y/o M previously seen as outpatient for left neck cyst/submandibular swelling. Underwent I/D by Dr. Nuñez last Wednesday in the office. Notes worsening swelling over the weekend. Admitted yesterday for possible I/D as an inpatient. No dysphagia, odynophagia or difficulty breathing. Had similar issue on the right side in the past. No fever/chills. No other complaints.      Past Medical History: Past Medical History[1]     Past Surgical History: Past Surgical History[2]     Medication: Scheduled Meds:Scheduled Medications[3]  Continuous Infusions:Medication Infusions[4]  PRN Meds:.PRN Medications[5]     Allergies:  Allergies[6]  Pertinent Family History: Family History[7]     Pertinent Social History:   Social History     Socioeconomic History    Marital status:      Spouse name: Not on file    Number of children: Not on file    Years of education: Not on file    Highest education level: Not on file   Occupational History    Not on file   Tobacco Use    Smoking status: Never    Smokeless tobacco: Never   Vaping Use    Vaping status: Never Used   Substance and Sexual Activity    Alcohol use: Yes     Alcohol/week: 1.0 standard drink of alcohol     Types: 1 Glasses of wine per week     Comment:  socially     Drug use: Never    Sexual activity: Not on file   Other Topics Concern    Caffeine Concern No    Exercise No    Seat Belt No    Special Diet No    Stress Concern No    Weight Concern No   Social History Narrative    Not on file     Social Drivers of Health     Food Insecurity: Patient Declined (6/8/2025)    NCSS - Food Insecurity     Worried About Running Out of Food in the Last Year: Patient declined     Ran Out of Food in the Last Year: Patient declined   Transportation Needs: Patient Declined (6/8/2025)    NCSS - Transportation     Lack of Transportation: Patient  declined   Stress: Not on file   Housing Stability: Patient Declined (6/8/2025)    NCSS - Housing/Utilities     Has Housing: Patient declined     Worried About Losing Housing: Patient declined     Unable to Get Utilities: Patient declined        Review of Systems:  Constitutional: Negative.  HENT: see above  Eyes: Negative.  Respiratory: Negative.  Cardiovascular: Negative.  Gastrointestinal: Negative.  Musculoskeletal: Negative.  Skin: Negative.  Renal: Negative  Endocrine: Negative  Psychiatric/Behavioral: Negative.     Physical Examination:  Vitals: Blood pressure 142/84, pulse 51, temperature 97.9 °F (36.6 °C), temperature source Oral, resp. rate 16, height 6' (1.829 m), weight 212 lb 1.3 oz (96.2 kg), SpO2 92%.     General: Breathing comfortably on room air while sitting up. Able to communicate verbally. Voice normal. Normal appearing body habitus.     Musculoskeletal: Head: Atraumatic and normocephalic.     Neck: Full ROM and able to extend without issues; left neck with 6-7 cm area of induration along submandibular space, firm with some fluctuance centrally, mild erythema, no drainage     Ears: Well formed pinnae bilaterally     Nose: No sinus tenderness bilaterally upon palpation. No obvious nasal deformity. No masses, rhinorrhea, epistaxis.      Mouth/Throat: Floor of mouth soft, bilateral mandibular mary; Salivary glands appear normal with no evidence of pain or mass. No masses or lesions noted within the oral mucosa, hard and soft palates, tongue, tonsils and posterior pharynx. Able to tolerate secretions. Oral cavity and oropharynx widely patent. Tonsils 1 plus and symmetric. Posterior pharyngeal walls appear normal.      Eyes: Extraocular movements intact and pupils equally reactive to light stimulus. No spontaneous or gaze-evoked nystagmus. No proptosis or ecchymosis. VFI.     Lymphatic: No significant cervical lymphadenopathy noted.     Neuro: CN 7 intact with symmetric mobility and strength. No loss  of facial sensation.     Skin: Dry, normal turgor, normal color.     Psych: Alert and oriented to person/place/time. Normal affect, amiable     Significant laboratory values:   Recent Labs   Lab 06/07/25  2339 06/09/25  0609   RBC 4.53 4.27*   HGB 14.0 13.0   HCT 41.0 38.7*   MCV 90.5 90.6   MCH 30.9 30.4   MCHC 34.1 33.6   RDW 12.7 12.6   NEPRELIM 5.30 4.57   WBC 7.3 7.1   .0 257.0          Imaging:   CT neck personally reviewed    FINDINGS:  NASOPHARYNX:  Fossae of Rosenmuller and torus tubarius are symmetric.    ORAL CAVITY:  No visible mass.  There is mild mass effect on the left side of the tongue by rim enhancing fluid collections.  The rim enhancing fluid collections are located within the left submandibular region.  There is additional mild mass effect on  the floor of the mouth muscles.  The larger of the 2 collections measures approximately 3.4 x 5.3 x 2.5 cm.  Previous measurements were 3.4 x 2.9 x 3.2 cm on the CT from 5/3/2025. The 2nd collection which may be communicating with the larger collection  is located anterior and inferior to the larger collection and measures 1.7 x 1.7 x 1.6 cm.  Previous measurements were 1.4 x 1.3 x 1.4 cm.  There is subcutaneous fat stranding and infiltration in the adjacent left neck along the strap muscles.  The left  submandibular gland is slightly hypodense, enlarged and displaced inferiorly.  Findings suggest sialadenitis.  No sialolith is identified.     OROPHARYNX:  Faucial and lingual tonsils are symmetric.  Tonsillith noted incidentally on the left.  HYPOPHARYNX:  No mass or other visible lesion.    LARYNX:  The vocal cords are symmetric and without mass.    SINUSES:  Limited views show no significant fluid or mucosal thickening.    NECK GLANDS:  The right submandibular, bilateral parotid glands and the thyroid gland appear unremarkable.  Abnormal appearance of left submandibular gland as described above.  LYMPH NODES:  No pathological-appearing or enlarged  lymph nodes.  Nonenlarged lymph nodes are seen.  SKULL BASE:  Foramina are symmetric without bony erosion.    VASCULATURE:  Limited views are unremarkable.    BONES:  No significant osseous lesions.    OTHER:  Interstitial patchy ground-glass infiltrates are seen in the upper lobes bilaterally.  The infiltrates were present previously.  These are nonspecific.  Inflammatory and infectious etiologies are within the differential.  Small pericardial recess   noted in the superior mediastinum.             Impression   CONCLUSION:    1. Enlarging left submandibular rim enhancing fluid collections are highly concerning for abscesses.  Necrotic adenopathy is also possible.  There is enlargement, displacement and decreased attenuation within the left submandibular gland compared to the  right.  Findings may reflect left submandibular sialadenitis.  No sialolith identified.  2. Persistent ground-glass interstitial infiltrates are seen in both upper lobes.  Findings are nonspecific and could reflect infectious or inflammatory etiologies.  3. Please see above for details.                Procedures: n/a     Assessment/Plan:   54 y/o M with left submandibular abscess    -Plan for incision and drainage in OR today  -cont NPO status, resume general diet post-op  -Cont IV antibiotics  -Primary care per Medicine team     Dr. Mcrae, thank you for involving me in this patient's care. Please contact me with further questions or concerns.    Yousuf Gutierrez MD         [1]   Past Medical History:   Acute deep vein thrombosis (DVT) of femoral vein of left lower extremity (HCC)    Acute pulmonary embolism (HCC)    ADD (attention deficit disorder)    Anxiety    Calculus of kidney    COVID    inpt for 14 days    Depression    Essential hypertension    PE (pulmonary thromboembolism) (HCC)    w covid     Sleep apnea   [2]   Past Surgical History:  Procedure Laterality Date    Colonoscopy  2009    Hernia surgery  2010    umbilical mesh     Repair ing hernia,5+y/o,reducibl      Skin surgery  2017    skin cancer removed    Vasectomy  2006   [3]    losartan  50 mg Oral Daily    [Held by provider] Perfluorohexyloctane  1 drop Ophthalmic Q6H    predniSONE  10 mg Oral Daily    piperacillin-tazobactam  3.375 g Intravenous Q8H    [Held by provider] enoxaparin  40 mg Subcutaneous Nightly   [4]    sodium chloride 100 mL/hr at 06/09/25 0548   [5]   acetaminophen    melatonin    polyethylene glycol (PEG 3350)    sennosides    bisacodyl    fleet enema    ondansetron    prochlorperazine    benzonatate    glycerin-hypromellose-    sodium chloride  [6] No Known Allergies  [7]   Family History  Problem Relation Age of Onset    Cancer Mother

## 2025-06-09 NOTE — PLAN OF CARE
Pt up in the chair. Left cheek, jaw and neck noted to be swollen; scabbed area under left jaw. Rating discomfort as moderate. Pt remains NPO for OR approx 1000 this morning.

## 2025-06-09 NOTE — ANESTHESIA POSTPROCEDURE EVALUATION
[facility]    Colin Helm Patient Status:  Inpatient   Age/Gender 55 year old male MRN [id number]   Location [facility] POST ANESTHESIA CARE UNIT Attending Mimi Ham MD   Hosp Day # 1 PCP George Grayson MD       Anesthesia Post-op Note    INCISION AND DRAINAGE SUB-MANDIBULAR ABSCESS    Procedure Summary       Date: 06/09/25 Room / Location:  MAIN OR 01 / [facility] MAIN OR    Anesthesia Start: 1124 Anesthesia Stop: 1202    Procedure: INCISION AND DRAINAGE SUB-MANDIBULAR ABSCESS (Left: Neck) Diagnosis:       Mandibular abscess      (Mandibular abscess [M27.2])    Surgeons: Yousuf Gutierrez MD Anesthesiologist: Wyatt Carvajal MD    Anesthesia Type: general ASA Status: 2            Anesthesia Type: general    Vitals Value Taken Time   /86 06/09/25 12:17   Temp 98.7 °F (37.1 °C) 06/09/25 12:02   Pulse 54 06/09/25 12:22   Resp 11 06/09/25 12:20   SpO2 95 % 06/09/25 12:22   Vitals shown include unfiled device data.        Patient Location: PACU    Anesthesia Type: general    Airway Patency: patent and extubated    Postop Pain Control: adequate    Mental Status: preanesthetic baseline    Nausea/Vomiting: inadequate, being treated    Cardiopulmonary/Hydration status: stable euvolemic    Complications: no apparent anesthesia related complications    Postop vital signs: stable    Dental Exam: Unchanged from Preop    Patient to be discharged from PACU when criteria met.

## 2025-06-09 NOTE — PLAN OF CARE
Pt tolerated reg diet - no drainage from incision and pain is mild. DC instructions gone over and given to pt. Pt will follow up with ENT at [facility].

## 2025-06-09 NOTE — PAYOR COMM NOTE
--------------  ADMISSION REVIEW     Payor: [facility]  Subscriber #:  [id number]  Authorization Number: [id number]    Admit date: 6/8/25  Admit time:  3:26 AM       REVIEW DOCUMENTATION:  ED Provider Notes signed by González Coyne DO at 6/8/2025  2:34 AM       Author: González Coyne DO Service: -- Author Type: Physician    Filed: 6/8/2025  2:34 AM Date of Service: 6/8/2025  1:28 AM Status: Signed     Patient Seen in: [facility] [place]    History  Chief Complaint   Patient presents with    Swelling Edema     Stated Complaint: facial swelling    HPI  This is a 55-year-old male presents emergency room for evaluation of neck swelling.  Patient reports he has been under care of ENT for neck swelling, on review of medical records patient recently had ultrasound-guided needle biopsy of left-sided submandibular mass, biopsy was consistent with inflammatory cells.  Patient did follow-up with ENT on 6/4/2025, it was noted biopsy was negative for malignancy.  ENT was concerned may represent sialoadenitis possible sublingual issue.  He was placed on Augmentin and prednisone.  Patient states the last 24 hours the areas become more swollen and tender, denies difficulty speaking or swallowing, denies any fevers or chills.  Denies chest pain or shortness of breath.  Denies difficulty opening or closing the mouth.    Physical Exam  ED Triage Vitals [06/07/25 2322]   BP (!) 140/91   Pulse 62   Resp 16   Temp 98.4 °F (36.9 °C)   Temp src Temporal   SpO2 94 %   O2 Device None (Room air)     Vital Signs  BP: 133/77  Pulse: 56  Resp: 18  Temp: 98.4 °F (36.9 °C)  Temp src: Temporal    Oxygen Therapy  SpO2: 96 %  O2 Device: None (Room air)    Physical Exam  GENERAL: Patient is awake, alert, well-appearing, in no acute distress.  HEENT: no scleral icterus.  Mucous membranes are moist, oropharynx is clear, uvula midline.  Sublingual space is soft.  No trismus.    NECK: Neck is supple, there is no nuchal rigidity.   Swelling to the left submandibular region with slight tenderness no erythema or warmth, no fluctuance, trachea midline.  No cervical lymphadenopathy.  HEART: Regular rate and rhythm, no murmurs.  LUNGS: Clear to auscultation bilaterally.  No Rales, no rhonchi, no wheezing, no stridor.  NEUROLOGIC EXAM: Tongue midline, no facial drooping, no ptosis,    ED Course  Labs Reviewed   BASIC METABOLIC PANEL (8) - Abnormal; Notable for the following components:       Result Value    Glucose 107 (*)     Calculated Osmolality 296 (*)     All other components within normal limits   CBC WITH DIFFERENTIAL WITH PLATELET     CT neck soft tissues with contrast  IMPRESSION:  There is a rim-enhancing collection compatible with abscess located in the left submandibular region, with slight mass effect on the floor of mouth muscles.  The collection measures about 5.6 x 3.4 cm in greatest transaxial dimensions and about 2.5 cm in the craniocaudal dimension.  There is a second likely communicating smaller collection extending inferiorly, measuring 1.3 cm in diameter.    There is subcutaneous fat stranding/infiltration in the adjacent left neck along the strap muscles.    The left submandibular gland is slightly hypodense and displaced inferiorly, likely representing sialoadenitis.  No sialolith identified.    The remainder of the cervical spaces are within normal limits.  The airway is patent.    Patchy groundglass opacities are noted in the lung apices in a nonspecific pattern.    MDM  Differential diagnosis before testing includes but not limited to submandibular abscess, cyst, mass, which is a medical condition that poses a threat to life/function    Radiographic images  I personally reviewed the radiographs and my individual interpretation shows CT reviewed, sublingual swelling/abscess noted  I also reviewed the official reports that showed There is a rim-enhancing collection compatible with abscess located in the left submandibular  region, with slight mass effect on the floor of mouth muscles.  The collection measures about 5.6 x 3.4 cm in greatest transaxial dimensions and about 2.5 cm in the craniocaudal dimension.  There is a second likely communicating smaller collection extending inferiorly, measuring 1.3 cm in diameter.    There is subcutaneous fat stranding/infiltration in the adjacent left neck along the strap muscles.    The left submandibular gland is slightly hypodense and displaced inferiorly, likely representing sialoadenitis.  No sialolith identified.    The remainder of the cervical spaces are within normal limits.  The airway is patent.    Patchy groundglass opacities are noted in the lung apices in a nonspecific pattern.    Discussion of management (consult/physicians, social work, pharmacy,ect) ENT , Dr Rajan [facility]    External chart review included ENT notes reviewed as in HPI    Medications Provided: IV Zosyn  Course of Events during Emergency Room Visit include IV established, blood work obtained.  Medical records reviewed.  CBC and chemistry unremarkable, CT performed which did reveal rim-enhancing lesion concerning for abscess, I discussed with ENT who recommended admission and IV antibiotics.  I discussed this with the patient he agrees with plan.  Patient be transferred to [facility] for admission, discussed with hospitalist physician.  Vital signs stable.    Shared decision making was utilized     Disposition:  Admission  I have discussed with the patient the results of test, differential diagnosis, and treatment plan. They expressed clear understanding of these instructions and agrees to the plan provided.     Admission disposition: 6/8/2025  1:37 AM  Medical Decision Making  Disposition and Plan     Clinical Impression:  1. Submandibular abscess       Disposition:  Admit  6/8/2025  1:37 am     González Coyne DO on 6/8/2025  2:34 AM      History and Physical   Chief Complaint: enlarging  mass left jaw.neck    History of Present Illness:   55 year old male with PMHx FORTINO/HTN/ PE who presented to the hospital for swelling under his left jaw. He saw his ENT on 6/4 for follow up after a left submandibular mass needle biopsy which showed inflammatory cells. He was diagnosed with an abscess and it was drained in the office. He was placed on Augmentin and prednisone. Since going home, the swelling got worse and started to spread to his ear along his jawline. He had a pressure sensation and discomfort in his jaw which felt like he was chewing on meat for a week long. He denied any fever, chills, swallowing problems.      Lab 06/07/25  2339   RBC 4.53   HGB 14.0   HCT 41.0   MCV 90.5   MCH 30.9   MCHC 34.1   RDW 12.7   NEPRELIM 5.30   WBC 7.3   .0      *   BUN 16   CREATSERUM 0.91   EGFRCR 100   CA 9.4      K 3.5      CO2 27.0         Assessment & Plan:  #Submandibular abscess  -CT showing rim enhancing abscess in left submandibular region with slight mass effect on floor of mouth muscles 56 x3.4 cm, second likely communicating collection extending inferiorly 1.3 cm, sub-Q fat stranding along strap muscles, g-g opacities in lung apices  -met 0 SIRS criteria  -failed outpt Augmentin and decadron  -antibiotics: zosyn  -tylenol prn  -NPO  -cont prednisone  -ENT c/s in ED     #HTN  -cont home losartan      6/9/2025  Otolaryngology Consultation Note  Reason for consultation: neck abscess    HPI: 56 y/o M previously seen as outpatient for left neck cyst/submandibular swelling. Underwent I/D by Dr. Nuñez last Wednesday in the office. Notes worsening swelling over the weekend. Admitted yesterday for possible I/D as an inpatient. No dysphagia, odynophagia or difficulty breathing. Had similar issue on the right side in the past. No fever/chills. No other complaints.       Lab 06/07/25  2339 06/09/25  0609   RBC 4.53 4.27*   HGB 14.0 13.0   HCT 41.0 38.7*   MCV 90.5 90.6   MCH 30.9 30.4    MCHC 34.1 33.6   RDW 12.7 12.6   NEPRELIM 5.30 4.57   WBC 7.3 7.1   .0 257.0       Assessment/Plan:   54 y/o M with left submandibular abscess     -Plan for incision and drainage in OR today  -cont NPO status, resume general diet post-op  -Cont IV antibiotics  -Primary care per Medicine team      Otolaryngology Operative Note     Preoperative Diagnosis: Left submandibular cyst   Postoperative Diagnosis: Left submandibular cyst     Procedure: Incision and drainage of left submandibular cyst      Findings: Large left submandibular cyst with primarily serous fluid         MEDICATIONS ADMINISTERED:  fentaNYL (Sublimaze) 50 mcg/mL injection       Date Action Dose Route User    6/9/2025 1130 Given 50 mcg Intravenous Wyatt Carvajal MD          glycopyrrolate (Robinul) 0.2 MG/ML injection       Date Action Dose Route User    6/9/2025 1150 Given 0.4 mg Intravenous Wyatt Carvajal MD          HYDROmorphone (Dilaudid) 1 MG/ML injection 0.4 mg       Date Action Dose Route User    6/9/2025 1222 Given 0.4 mg Intravenous Leah Post RN          ketorolac (Toradol) 30 MG/ML injection       Date Action Dose Route User    6/9/2025 1150 Given 30 mg Intravenous Wyatt Carvajal MD          lactated ringers infusion       Date Action Dose Route User    6/9/2025 1215 New Bag (none) Intravenous Leah Post RN          lidocaine 1%-EPINEPHrine 1:100,000 (Xylocaine-Epinephrine) injection       Date Action Dose Route User    6/9/2025 1144 Given 3 mL Infiltration (Left Neck) Yousuf Gutierrez MD          metoclopramide (Reglan) 5 mg/mL injection       Date Action Dose Route User    6/9/2025 1204 Given 10 mg Intravenous Leah Post RN          metoclopramide (Reglan) 5 mg/mL injection 10 mg       Date Action Dose Route User    6/9/2025 1204 Given 10 mg Intravenous Leah Post RN          neostigmine (Bloxiverz) 10 mg/10mL injection       Date Action Dose Route User    6/9/2025 1150 Given 3 mg Intravenous  Wyatt Carvajal MD          ondansetron (Zofran) 4 MG/2ML injection       Date Action Dose Route User    6/9/2025 1126 Given 4 mg Intravenous Wyatt Carvajal MD          piperacillin-tazobactam (Zosyn) 3.375 g in dextrose 5% 100 mL IVPB-ADDV       Date Action Dose Route User    6/9/2025 0548 New Bag 3.375 g Intravenous RickolCathy RN    6/8/2025 2127 New Bag 3.375 g Intravenous Cathy Dalton RN          propofol (Diprivan) 10 MG/ML injection       Date Action Dose Route User    6/9/2025 1130 Given 200 mg Intravenous Wyatt Carvajal MD          rocuronium (Zemuron) 50 mg/5mL injection       Date Action Dose Route User    6/9/2025 1130 Given 10 mg Intravenous Wyatt Carvajal MD          sodium chloride 0.9% infusion       Date Action Dose Route User    6/9/2025 1124 Restarted (none) Intravenous Wyatt Carvajal MD    6/9/2025 0548 New Bag (none) Intravenous Cathy Dalton RN    6/8/2025 1511 New Bag (none) Intravenous Nini Yañez RN          succinylcholine (Anectine) 20 MG/ML injection       Date Action Dose Route User    6/9/2025 1130 Given 100 mg Intravenous Wyatt Carvajal MD     Vitals      Date/Time Temp Pulse Resp BP SpO2 Weight O2 Device O2 Flow Rate (L/min) [facility]    06/09/25 1300 98 °F (36.7 °C) 59 17 131/76 95 % -- None (Room air) --     06/09/25 1250 -- 54 17 -- 98 % -- -- --     06/09/25 1245 -- 52 16 130/76 98 % -- -- --     06/09/25 1240 -- 58 17 -- 98 % -- -- --     06/09/25 1235 -- 53 14 -- 96 % -- None (Room air) --     06/09/25 1230 -- 53 12 134/77 -- -- -- --     06/09/25 1225 -- 54 12 -- 95 % -- -- -- RH    06/09/25 1222 -- 58 11 -- 95 % -- -- -- RH    06/09/25 1215 -- 60 10 142/86 94 % -- -- -- RH    06/09/25 1210 -- 63 11 156/89 93 % -- -- -- RH    06/09/25 1205 -- 69 18 143/82 95 % -- -- -- RH    06/09/25 1202 98.7 °F (37.1 °C) 67 15 163/91 95 % -- None (Room air) -- RH    06/09/25 0346 97.9 °F (36.6 °C) 51 16 142/84 92 % -- None (Room air) -- AE     06/08/25 1942 98.7 °F (37.1 °C) 56 16 118/64 96 % -- None (Room air) -- AE    06/08/25 1203 98.2 °F (36.8 °C) 57 16 135/71 95 % -- None (Room air) -- DV    06/08/25 0913 -- 50 18 119/80 98 % -- None (Room air) -- GD    06/08/25 0835 -- -- -- -- -- 212 lb 1.3 oz (96.2 kg) -- -- BW    06/08/25 0335 97.9 °F (36.6 °C) 52 18 136/86 100 % -- None (Room air) -- AE    06/08/25 0217 -- 56 18 133/77 96 % -- None (Room air) -- TB     06/07/25 2322 98.4 °F (36.9 °C) 62 16 140/91 Abnormal  94 % 212 lb 1.3 oz (96.2 kg) None (Room air) --        FOR REVIEW/APPROVAL OF INPT ADMISSION

## 2025-06-09 NOTE — ANESTHESIA PROCEDURE NOTES
Airway  Date/Time: 6/9/2025 11:30 AM  Reason: elective    Airway not difficult    General Information and Staff   Patient location during procedure: OR  Anesthesiologist: Wyatt Carvajal MD  Performed: anesthesiologist   Performed by: Wyatt Carvajal MD  Authorized by: Wyatt Carvajal MD        Indications and Patient Condition  Indications for airway management: anesthesia  Sedation level: deep      Preoxygenated: yesPatient position: sniffing    Mask difficulty assessment: 1 - vent by mask    Final Airway Details    Final airway type: endotracheal airway    Successful airway: ETT  Cuffed: yes   Successful intubation technique: direct laryngoscopy  Endotracheal tube insertion site: oral  Blade: GlideScope  Blade size: #4  ETT size (mm): 7.0    Cormack-Lehane Classification: grade I - full view of glottis  Placement verified by: capnometry   Cuff volume (mL): 8  Measured from: lips  ETT to lips (cm): 23  Number of attempts at approach: 1  Number of other approaches attempted: 0

## 2025-06-09 NOTE — PLAN OF CARE
Pt returned from PACU via bed. Pt alert and oriented. Pt rating pain as mild and declines analgesia. Left neck incision CDI with steristrips; neck and jaw cont with edema but less than before the sx. Pt c/o throat being scratchy from anes tube. To be started on CL and AAT. Pt denies nausea. DTV 2100.

## 2025-06-09 NOTE — PLAN OF CARE
A&Ox4. VSS. RA, .  GI: Abdomen soft. Last BM was 6/8/25.  : Up to the restroom for voids.  He reports his pain as moderate to the left jaw and neck.  Up independently.   Diet: NPO since midnight  IVF running per order. IV abx per order.      Patient noted to have a quarter sized scab below the left jaw it is dry and brown, no drainage noted. He does have significant swelling to the left side of his neck and jaw, the swelling is less noticeable in his left cheek and under his left eye compared to when he arrived to the unit. He is tentatively scheduled for an incision and drainage in the OR later this morning,     All appropriate safety measures in place. All questions and concerns addressed to the best of my ability

## 2025-06-09 NOTE — PROGRESS NOTES
[facility]   part of [facility]     Hospitalist Progress Note     Colin Helm Patient Status:  Inpatient    1970 MRN [id number]   Location [facility] 3NW-A Attending Mimi Ham MD   Hosp Day # 1 PCP George Grayson MD     Chief Complaint: left submandibular mass with increased swelling and pain    Subjective:     Patient is npo for sx this am  C/o worsening pain and swelling    Objective:    Review of Systems:   A comprehensive review of systems was completed; pertinent positive and negatives stated in subjective.    Vital signs:  Temp:  [97.9 °F (36.6 °C)-98.7 °F (37.1 °C)] 97.9 °F (36.6 °C)  Pulse:  [50-57] 51  Resp:  [16-18] 16  BP: (118-142)/(64-84) 142/84  SpO2:  [92 %-98 %] 92 %    Physical Exam:    General: No acute distress  Respiratory: No wheezes, no rhonchi  Cardiovascular: S1, S2, regular rate and rhythm  Abdomen: Soft, Non-tender, non-distended, positive bowel sounds  Neuro: No new focal deficits.   Extremities: No edema      Diagnostic Data:    Labs:  Recent Labs   Lab 25  2339 25  0609   WBC 7.3 7.1   HGB 14.0 13.0   MCV 90.5 90.6   .0 257.0   INR  --  1.11       Recent Labs   Lab 25  2339 25  0609   * 103*   BUN 16 11   CREATSERUM 0.91 0.95   CA 9.4 9.4    145   K 3.5 3.9    109   CO2 27.0 29.0       Estimated Glomerular Filtration Rate: 95 mL/min/1.73m2 (result from lab).    No results for input(s): \"TROP\", \"TROPHS\", \"CK\" in the last 168 hours.    Recent Labs   Lab 25  0609   PTP 14.4   INR 1.11                  Microbiology    No results found for this visit on 25.      Imaging: Reviewed in Epic.    Medications: Scheduled Medications[1]    Assessment & Plan:      #Submandibular abscess  -CT showing rim enhancing abscess in left submandibular region with slight mass effect on floor of mouth muscles 56 x3.4 cm, second likely communicating collection extending inferiorly 1.3 cm, sub-Q fat stranding along  strap muscles, g-g opacities in lung apices    -failed outpt Augmentin and decadron  -antibiotics: zosyn  -tylenol prn  -NPO  -cont prednisone  -ENT c/s in ED     #HTN  -cont home losartan      Mimi Ham MD    Supplementary Documentation:     Quality:  DVT Mechanical Prophylaxis:   SCDs,    DVT Pharmacologic Prophylaxis   Medication    [Held by provider] enoxaparin (Lovenox) 40 MG/0.4ML SUBQ injection 40 mg                Code Status: Not on file  Damon: No urinary catheter in place  Damon Duration (in days):   Central line:    ALEJANDRO: 6/11/2025    Discharge is dependent on: progress  At this point Mr. Helm is expected to be discharge to: home    The 21st Century Cures Act makes medical notes like these available to patients in the interest of transparency. Please be advised this is a medical document. Medical documents are intended to carry relevant information, facts as evident, and the clinical opinion of the practitioner. The medical note is intended as peer to peer communication and may appear blunt or direct. It is written in medical language and may contain abbreviations or verbiage that are unfamiliar.                         [1]    losartan  50 mg Oral Daily    [Held by provider] Perfluorohexyloctane  1 drop Ophthalmic Q6H    predniSONE  10 mg Oral Daily    piperacillin-tazobactam  3.375 g Intravenous Q8H    [Held by provider] enoxaparin  40 mg Subcutaneous Nightly

## 2025-06-09 NOTE — OPERATIVE REPORT
Otolaryngology Operative Note     Preoperative Diagnosis: Left submandibular cyst     Postoperative Diagnosis: Left submandibular cyst     Procedure: Incision and drainage of left submandibular cyst     Anesthesia: CARLIN     Surgeon: Yousuf Gutierrez MD    EBL: 1 mL     IVF: As per anesthesia     Findings: Large left submandibular cyst with primarily serous fluid     Implants: None     Complications: None     Detail: Patient was taken to the operating room by the anesthesia service and time out was performed with all physicians present. GETA was instilled and patient's care was then handed over to the Otolaryngology service. 3 cm incision was marked along the left neck. Incision was injected with 3 mL 1% lidocaine with 1:100,000 epinephrine and allowed to sit. Patient was then prepped and draped in sterile fashion. 15 bladed was used to incise skin. No purulence was noted. Capsule of cyst was appreciated deep to subcutaneous fat. 16 gauge needle was used to aspirate cyst. Approximately 10 ml of primarily serous fluid was aspirated. Valsalva was performed followed by copious irrigation. Hemostasis was achieved. 3-0 vicryl was used to close subdermal layer. 4-0 biosyn was used to close the skin. Steri-strip was placed over the incision.      Care of the patient was then handed back over to the Anesthesia staff for appropriate awakening, extubation, and transfer to the PACU in stable condition.     Yousuf Gutierrez MD  Otolaryngology Attending

## 2025-06-09 NOTE — PLAN OF CARE
Pt tolerated CL diet and asked to be advanced to regular diet. Zosyn cont as ordered. No drainage noted from left neck

## 2025-06-10 NOTE — PAYOR COMM NOTE
--------------  DISCHARGE REVIEW    Payor: [facility]  Subscriber #:  [id number]  Authorization Number: [id number]    Admit date: 6/8/25  Admit time:   3:26 AM  Discharge Date: 6/9/2025  5:33 PM     Admitting Physician: Nicolle Rajan DO  Attending Physician:  No att. providers found  Primary Care Physician: George Grayson MD       6/9/2025 11:38 AM INCISION AND DRAINAGE SUB-MANDIBULAR ABSCESS    Yousuf Gutierrez MD               Signed         Otolaryngology Operative Note     Preoperative Diagnosis: Left submandibular cyst     Postoperative Diagnosis: Left submandibular cyst     Procedure: Incision and drainage of left submandibular cyst     Anesthesia: GETA     Surgeon: Yousuf Gutierrez MD     EBL: 1 mL     IVF: As per anesthesia     Findings: Large left submandibular cyst with primarily serous fluid     Implants: None     Complications: None              Pt tolerated reg diet - no drainage from incision and pain is mild. DC instructions gone over and given to pt. Pt will follow up with ENT at [place].            Electronically signed by Greg Morin RN at 6/9/2025  5:28 PM

## 2025-06-10 NOTE — PROGRESS NOTES
Provider Clarification    Please clarify the relationship, if any, between submandibular abscess and  ultrasound-guided needle biopsy of left-sided submandibular mass performed 5/2025    Unknown      This note is part of the patient's medical record.

## 2025-06-11 ENCOUNTER — TELEPHONE (OUTPATIENT)
Dept: FAMILY MEDICINE CLINIC | Facility: CLINIC | Age: 55
End: 2025-06-11

## 2025-06-11 DIAGNOSIS — R90.89 ABNORMAL FINDING ON MRI OF BRAIN: Primary | ICD-10-CM

## 2025-06-11 NOTE — DISCHARGE SUMMARY
[facility]     Colin Helm Patient Status:  Inpatient    1970 MRN [id number]   Location [facility] 3NW-A Attending No att. providers found   Hosp Day # 1 PCP George Grayson MD     Date of Admission: 2025  Date of Discharge:  2025     Discharge Disposition: Home or Self Care    Discharge Diagnosis:  Hypertension  Left submandibular cyst  FORTINO    History of Present Illness: 55 years old man presents to the emergency room with swelling and pain to the left jaw and he states that he had a cyst drained and that the area and after that he noticed that his left mandibular area became more swollen and painful.    Brief Synopsis: Patient saw Dr. Martinez From ENT on  for follow-up after left submandibular mass needle biopsy which showed inflammatory cells.  He was diagnosed with an abscess at that time and that was drained in the office and he was placed on Augmentin and prednisone.  He denies any fever or chills were swallowing problems.  CT neck shows a rim-enhancing abscess formation in the left submandibular region with slight shift effect on floor of the mouth muscle likely communicating collection extending inferiorly and subcu fat.  Patient was started on Zosyn Tylenol continued on prednisone and kept n.p.o. until seen by ENT.  Patient had right-sided mass biopsy last year that was therapeutic.  Biopsy at that time was consistent with a cystic mass.  Patient was seen by ENT and plan was to do incision and drainage in OR.  Postop diagnosis was left submandibular cyst with procedure.  Incision and drainage of left submandibular cyst.  Approximately 10 mL of primarily serous fluid was aspirated.  Upon coming in the patient's white count was found to be normal.  Aerobic and anaerobic cultures from the cyst were sent on  and they are still pending.  Patient was discharged to home on pain medicines prednisone and advised to finish Augmentin course he started  before admission.  He will follow-up with ENT in couple of days.    Lace+ Score: 34  59-90 High Risk  29-58 Medium Risk  0-28   Low Risk       TCM Follow-Up Recommendation:  LACE 29-58: Moderate Risk of readmission after discharge from the hospital.      Procedures during hospitalization:   As above    Incidental or significant findings and recommendations (brief descriptions):  Follow-up with PCP for chest x-ray and follow-up with ENT    Lab/Test results pending at Discharge:   Follow-up CT lungs or chest x-ray per PCP evaluation as patient has interstitial patchy groundglass infiltrates in the upper lobes bilaterally.    Consultants:  ENT    Discharge Medication List:     Discharge Medications        CONTINUE taking these medications        Instructions Prescription details   amoxicillin clavulanate 875-125 MG Tabs  Commonly known as: Augmentin      Take 1 tablet by mouth every 12 (twelve) hours.   Quantity: 20 tablet  Refills: 0     amoxicillin clavulanate 875-125 MG Tabs  Commonly known as: Augmentin      Take 1 tablet by mouth every 12 (twelve) hours.   Quantity: 20 tablet  Refills: 0     losartan 50 MG Tabs  Commonly known as: Cozaar      Take 1 tablet (50 mg total) by mouth daily.   Quantity: 90 tablet  Refills: 1     Miebo 1.338 GM/ML Soln  Generic drug: Perfluorohexyloctane      Apply 1 drop to eye every 6 (six) hours.   Refills: 0     predniSONE 10 MG Tabs  Commonly known as: Deltasone      Take 1 tablet (10 mg total) by mouth daily.   Quantity: 7 tablet  Refills: 0            STOP taking these medications      PEG 3350-KCl-Na Bicarb-NaCl 420 g Solr  Commonly known as: TriLyte                 [place]     Follow-up appointment:   Yousuf Gutierrez MD  [address] [address] [address]  [phone]    Schedule an appointment as soon as possible for a visit  As directed    Appointments for Next 30 Days 6/11/2025 - 7/11/2025      None            Vital signs:       Physical Exam:     General: No acute distress   Lungs: clear to auscultation  Cardiovascular: S1, S2  Abdomen: Soft      -----------------------------------------------------------------------------------------------  PATIENT DISCHARGE INSTRUCTIONS: See electronic chart    Mimi Ham MD    Total time spent on discharge plannin minutes     The  [employer] Cures Act makes medical notes like these available to patients in the interest of transparency. Please be advised this is a medical document. Medical documents are intended to carry relevant information, facts as evident, and the clinical opinion of the practitioner. The medical note is intended as peer to peer communication and may appear blunt or direct. It is written in medical language and may contain abbreviations or verbiage that are unfamiliar.

## 2025-06-18 ENCOUNTER — MED REC SCAN ONLY (OUTPATIENT)
Facility: LOCATION | Age: 55
End: 2025-06-18

## (undated) DIAGNOSIS — Z13.6 SCREENING FOR CARDIOVASCULAR CONDITION: Primary | ICD-10-CM

## (undated) DEVICE — SUT MCRYL 4-0 18IN PS-2 ABSRB UD 19MM 3/8 CIR

## (undated) DEVICE — GLOVE SUR 6.5 SENSICARE PI PIP CRM PWD F

## (undated) DEVICE — PACK DRAPE HEAD/NECK STER

## (undated) DEVICE — YANKAUER,FLEXIBLE HANDLE,FINE CAPACITY: Brand: MEDLINE

## (undated) DEVICE — GAUZE,SPONGE,4"X4",12PLY,STERILE,LF,2'S: Brand: MEDLINE

## (undated) DEVICE — SLEEVE COMPR MD KNEE LEN SGL USE KENDALL SCD

## (undated) DEVICE — 3M™ STERI-STRIP™ REINFORCED ADHESIVE SKIN CLOSURES, R1547, 1/2 IN X 4 IN (12 MM X 100 MM), 6 STRIPS/ENVELOPE: Brand: 3M™ STERI-STRIP™

## (undated) DEVICE — HOOK RETRCT BLDE L5MM E STAY BLNT LONE STAR

## (undated) DEVICE — INSULATED BLADE ELECTRODE: Brand: EDGE

## (undated) DEVICE — ANTIBACTERIAL UNDYED BRAIDED (POLYGLACTIN 910), SYNTHETIC ABSORBABLE SUTURE: Brand: COATED VICRYL

## (undated) DEVICE — COVER,LIGHT,CAMERA,HARD,1/PK,STRL: Brand: MEDLINE

## (undated) DEVICE — SPONGE: SPECIALTY PEANUT XR 100/CS: Brand: MEDICAL ACTION INDUSTRIES

## (undated) DEVICE — CABLE BPLR L12FT FLYING LD DISP

## (undated) DEVICE — APPLICATOR PREP 10.5ML ORNG CHG 2% ISO ALC

## (undated) DEVICE — SOLUTION IRRIG 1000ML 0.9% NACL USP BTL

## (undated) NOTE — LETTER
[address]  [address]  Authorization for Surgical Operation and Procedure     Date:___________                                                                                                         Time:__________  I hereby authorize Surgeon(s):  Yousuf Gutierrez MD, my physician and his/her assistants (if applicable), which may include medical students, residents, and/or fellows, to perform the following surgical operation/ procedure and administer such anesthesia as may be determined necessary by my physician:  Operation/Procedure name (s) Procedure(s):  INCISION AND DRAINAGE SUB-MANDIBULAR ABSCESS on Colin Helm   2.   I recognize that during the surgical operation/procedure, unforeseen conditions may necessitate additional or different procedures than those listed above.  I, therefore, further authorize and request that the above-named surgeon, assistants, or designees perform such procedures as are, in their judgment, necessary and desirable.    3.   My surgeon/physician has discussed prior to my surgery the potential benefits, risks and side effects of this procedure; the likelihood of achieving goals; and potential problems that might occur during recuperation.  They also discussed reasonable alternatives to the procedure, including risks, benefits, and side effects related to the alternatives and risks related to not receiving this procedure.  I have had all my questions answered and I acknowledge that no guarantee has been made as to the result that may be obtained.    4.   Should the need arise during my operation/procedure, which includes change of level of care prior to discharge, I also consent to the administration of blood and/or blood products.  Further, I understand that despite careful testing and screening of blood or blood products by collecting agencies, I may still be subject to ill effects as a result of receiving a blood transfusion and/or blood  products.  The following are some, but not all, of the potential risks that can occur: fever and allergic reactions, hemolytic reactions, transmission of diseases such as Hepatitis, AIDS and Cytomegalovirus (CMV) and fluid overload.  In the event that I wish to have an autologous transfusion of my own blood, or a directed donor transfusion, I will discuss this with my physician.  Check only if Refusing Blood or Blood Products  I understand refusal of blood or blood products as deemed necessary by my physician may have serious consequences to my condition to include possible death. I hereby assume responsibility for my refusal and release the hospital, its personnel, and my physicians from any responsibility for the consequences of my refusal.          o  Refuse      5.   I authorize the use of any specimen, organs, tissues, body parts or foreign objects that may be removed from my body during the operation/procedure for diagnosis, research or teaching purposes and their subsequent disposal by hospital authorities.  I also authorize the release of specimen test results and/or written reports to my treating physician on the hospital medical staff or other referring or consulting physicians involved in my care, at the discretion of the Pathologist or my treating physician.    6.   I consent to the photographing or videotaping of the operations or procedures to be performed, including appropriate portions of my body for medical, scientific, or educational purposes, provided my identity is not revealed by the pictures or by descriptive texts accompanying them.  If the procedure has been photographed/videotaped, the surgeon will obtain the original picture, image, videotape or CD.  The hospital will not be responsible for storage, release or maintenance of the picture, image, tape or CD.    7.   I consent to the presence of a  or observers in the operating room as deemed necessary by my physician or  their designees.    8.   I recognize that in the event my procedure results in extended X-Ray/fluoroscopy time, I may develop a skin reaction.    9. If I have a Do Not Attempt Resuscitation (DNAR) order in place, that status will be suspended while in the operating room, procedural suite, and during the recovery period unless otherwise explicitly stated by me (or a person authorized to consent on my behalf). The surgeon or my attending physician will determine when the applicable recovery period ends for purposes of reinstating the DNAR order.  10. Patients having a sterilization procedure: I understand that if the procedure is successful the results will be permanent and it will therefore be impossible for me to inseminate, conceive, or bear children.  I also understand that the procedure is intended to result in sterility, although the result has not been guaranteed.   11. I acknowledge that my physician has explained sedation/analgesia administration to me including the risk and benefits I consent to the administration of sedation/analgesia as may be necessary or desirable in the judgment of my physician.    I CERTIFY THAT I HAVE READ AND FULLY UNDERSTAND THE ABOVE CONSENT TO OPERATION and/or OTHER PROCEDURE.    _________________________________________  __________________________________  Signature of Patient     Signature of Responsible Person         ___________________________________         Printed Name of Responsible Person           _________________________________                 Relationship to Patient  _________________________________________  ______________________________  Signature of Witness          Date  Time      Patient Name: Colin YESI Helm     : 1970                 Printed: 2025     Medical Record #: [id number]                     Page 1 of [address] [place] [address]  [id number]    Consent for Anesthesia    Colin HAZEL  YESI Helm agree to be cared for by an anesthesiologist, who is specially trained to monitor me and give me medicine to put me to sleep or keep me comfortable during my procedure    I understand that my anesthesiologist is not an employee or agent of [facility] [employer]. He or she works for [employer]: turn off the lights.    As the patient asking for anesthesia services, I agree to:  Allow the anesthesiologist (anesthesia doctor) to give me medicine and do additional procedures as necessary. Some examples are: Starting or using an “IV” to give me medicine, fluids or blood during my procedure, and having a breathing tube placed to help me breathe when I’m asleep (intubation). In the event that my heart stops working properly, I understand that my anesthesiologist will make every effort to sustain my life, unless otherwise directed by [facility] Do Not Resuscitate documents.  Tell my anesthesia doctor before my procedure:  If I am pregnant.  The last time that I ate or drank.  All of the medicines I take (including prescriptions, herbal supplements, and pills I can buy without a prescription (including street drugs/illegal medications). Failure to inform my anesthesiologist about these medicines may increase my risk of anesthetic complications.  If I am allergic to anything or have had a reaction to anesthesia before.  I understand how the anesthesia medicine will help me (benefits).  I understand that with any type of anesthesia medicine there are risks:  The most common risks are: nausea, vomiting, sore throat, muscle soreness, damage to my eyes, mouth, or teeth (from breathing tube placement).  Rare risks include: remembering what happened during my procedure, allergic reactions to medications, injury to my airway, heart, lungs, vision, nerves, or muscles and in extremely rare instances death.  My doctor has explained to me other choices available to me for my care (alternatives).  Pregnant  Patients (“epidural”):  I understand that the risks of having an epidural (medicine given into my back to help control pain during labor), include itching, low blood pressure, difficulty urinating, headache or slowing of the baby’s heart. Very rare risks include infection, bleeding, seizure, irregular heart rhythms and nerve injury.  Regional Anesthesia (“spinal”, “epidural”, & “nerve blocks”):  I understand that rare but potential complications include headache, bleeding, infection, seizure, irregular heart rhythms, and nerve injury.    I can change my mind about having anesthesia services at any time before I get the medicine.    _____________________________________________________________________________  Patient (or Representative) Signature/Relationship to Patient  Date   Time    _____________________________________________________________________________   Name (if used)    Language/Organization   Time    _____________________________________________________________________________  Anesthesiologist Signature     Date   Time  I have discussed the procedure and information above with the patient (or patient’s representative) and answered their questions. The patient or their representative has agreed to have anesthesia services.    _____________________________________________________________________________  Witness        Date   Time  I have verified that the signature is that of the patient or patient’s representative, and that it was signed before the procedure  Patient Name: Colin EKY Volodymyr     : 1970                 Printed: 2025     Medical Record #: [id number]                     Page 2 of 2

## (undated) NOTE — LETTER
10/28/24    Patient: Colin Helm  : 1970 Visit date: 10/15/2024    Dear  George Grayson MD    Thank you for referring Colin Helm to my practice.  Please find my assessment and plan below.        I saw Colin in my office he presents for screening colonoscopy.  He is average risk based on his history.  Thank you Fredi  Sincerely,       Dave Smith DO   CC: No Recipients

## (undated) NOTE — Clinical Note
Pt has VV on 9/1/21. Pt reports he is doing pretty good. Medication list reviewed. Pt monitors B/P at home and is not taking B/P med as he feels he does not need it. Pt is checking pulse ox and using IS.  Pt does not have a PCP but would like to establish i

## (undated) NOTE — ED AVS SNAPSHOT
Isabel Espinosa   MRN: [id number]    Department:  [facility] in [place]   Date of Visit:  3/6/2019           Disclosure     Insurance plans vary and the physician(s) referred by the ER may not be covered by your plan.  Please contac tell this physician (or your personal doctor if your instructions are to return to your personal doctor) about any new or lasting problems. The primary care or specialist physician will see patients referred from the [facility] Emergency Department.  Jf Chang